# Patient Record
Sex: FEMALE | Race: WHITE | Employment: OTHER | URBAN - METROPOLITAN AREA
[De-identification: names, ages, dates, MRNs, and addresses within clinical notes are randomized per-mention and may not be internally consistent; named-entity substitution may affect disease eponyms.]

---

## 2017-03-03 ENCOUNTER — HOSPITAL ENCOUNTER (OUTPATIENT)
Dept: MAMMOGRAPHY | Age: 77
Discharge: HOME | End: 2017-03-03
Attending: FAMILY MEDICINE
Payer: MEDICARE

## 2017-03-03 DIAGNOSIS — Z12.31 VISIT FOR SCREENING MAMMOGRAM: ICD-10-CM

## 2017-03-03 PROCEDURE — 77067 SCR MAMMO BI INCL CAD: CPT

## 2017-04-19 ENCOUNTER — HOSPITAL ENCOUNTER (OUTPATIENT)
Dept: CT IMAGING | Age: 77
Discharge: HOME | End: 2017-04-19
Attending: FAMILY MEDICINE

## 2017-04-19 VITALS — DIASTOLIC BLOOD PRESSURE: 80 MMHG | SYSTOLIC BLOOD PRESSURE: 133 MMHG | HEART RATE: 76 BPM

## 2017-04-19 DIAGNOSIS — Z13.220 SCREENING CHOLESTEROL LEVEL: ICD-10-CM

## 2017-04-19 NOTE — IMAGING NOTE
Pt here labs drawn total rsvybo=000 hdl=67  Dws=220 muc=805 glucose =81  Calcium score =18.97 . Pt instructed to monitor bp and follow up with md if bp remains up.   Pr verbalizes understanding and agreement also pt instructed to follow up with dr Ybarra [facility]

## 2018-03-29 PROBLEM — H16.223 KERATITIS SICCA, BOTH EYES: Status: ACTIVE | Noted: 2018-03-29

## 2018-03-29 PROBLEM — Z96.1 BILATERAL PSEUDOPHAKIA: Status: ACTIVE | Noted: 2018-03-29

## 2018-03-29 PROBLEM — M35.01 KERATITIS SICCA, BOTH EYES (HCC): Status: ACTIVE | Noted: 2018-03-29

## 2018-03-29 PROBLEM — H01.00B BLEPHARITIS OF UPPER AND LOWER EYELIDS OF BOTH EYES, UNSPECIFIED TYPE: Status: ACTIVE | Noted: 2018-03-29

## 2018-03-29 PROBLEM — H01.00A BLEPHARITIS OF UPPER AND LOWER EYELIDS OF BOTH EYES, UNSPECIFIED TYPE: Status: ACTIVE | Noted: 2018-03-29

## 2018-03-30 ENCOUNTER — HOSPITAL ENCOUNTER (OUTPATIENT)
Dept: MAMMOGRAPHY | Facility: HOSPITAL | Age: 78
Discharge: HOME | End: 2018-03-30
Attending: FAMILY MEDICINE
Payer: MEDICARE

## 2018-03-30 DIAGNOSIS — Z12.31 ENCOUNTER FOR SCREENING MAMMOGRAM FOR MALIGNANT NEOPLASM OF BREAST: ICD-10-CM

## 2018-03-30 PROCEDURE — 77067 SCR MAMMO BI INCL CAD: CPT | Performed by: FAMILY MEDICINE

## 2018-10-11 ENCOUNTER — HOSPITAL ENCOUNTER (OUTPATIENT)
Dept: BONE DENSITY | Age: 78
Discharge: HOME | End: 2018-10-11
Attending: INTERNAL MEDICINE
Payer: MEDICARE

## 2018-10-11 DIAGNOSIS — M81.0 OSTEOPOROSIS: ICD-10-CM

## 2018-10-11 PROCEDURE — 77080 DXA BONE DENSITY AXIAL: CPT | Performed by: INTERNAL MEDICINE

## 2019-01-18 ENCOUNTER — HOSPITAL ENCOUNTER (OUTPATIENT)
Dept: ULTRASOUND IMAGING | Age: 79
Discharge: HOME | End: 2019-01-18
Attending: FAMILY MEDICINE

## 2019-01-18 DIAGNOSIS — Z13.9 VISIT FOR SCREENING: ICD-10-CM

## 2019-01-18 NOTE — PROGRESS NOTES
Pt seen at [facility], [place] for free PV screening  PRELIMINARY SCORE= bilateral carotids=1 no narrowing, no AAA noted  MF=658/74  Cholestec labs as follows: pt declined  TC=  HDL=  LDL=  TG=  GLUCOSE=  All results and risk factors discussed with patient; all

## 2019-04-03 ENCOUNTER — HOSPITAL ENCOUNTER (OUTPATIENT)
Dept: MAMMOGRAPHY | Facility: HOSPITAL | Age: 79
Discharge: HOME | End: 2019-04-03
Attending: FAMILY MEDICINE
Payer: MEDICARE

## 2019-04-03 DIAGNOSIS — Z12.31 ENCOUNTER FOR SCREENING MAMMOGRAM FOR MALIGNANT NEOPLASM OF BREAST: ICD-10-CM

## 2019-04-03 PROCEDURE — 77067 SCR MAMMO BI INCL CAD: CPT | Performed by: FAMILY MEDICINE

## 2019-04-03 PROCEDURE — 77063 BREAST TOMOSYNTHESIS BI: CPT | Performed by: FAMILY MEDICINE

## 2019-10-14 NOTE — H&P
[address] [facility] [address] Gastroenterology                                                                                                  Clinic History and Physical     Pa diverticula, no recurrent tumor in the rectum, moderate-sized internal hemorrhoids, rectal polyp removed?   (Hyperplastic polyp noted in pathology), 3 yr recall     Social Hx:  + Former smoker  + social EtOH  - Denies illicit drug use   - LMP: Postmenopausa negative for diplopia   RESPIRATORY:  negative for severe shortness of breath  CARDIOVASCULAR:  negative for crushing sub-sternal chest pain  GASTROINTESTINAL:  see HPI  GENITOURINARY:  negative for dysuria or gross hematuria  INTEGUMENT/BREAST:  SKIN:  ne asymptomatic from an upper and lower GI perspective with the exception of rare rectal bleeding related to hemorrhoids. No red flag/alarm symptoms. She would like to proceed with a colonoscopy recall.   Procedure and all questions/concerns regarding this w PREP KIT) 17.5-3.13-1.6 GM/177ML Oral Solution 1 Bottle 0     Sig: Take as directed per GI consult notes       Imaging & Referrals:  None       Krista G. Searcy Mcardle, APN  10/21/2019

## 2019-10-21 ENCOUNTER — TELEPHONE (OUTPATIENT)
Dept: GASTROENTEROLOGY | Facility: CLINIC | Age: 79
End: 2019-10-21

## 2019-10-21 ENCOUNTER — OFFICE VISIT (OUTPATIENT)
Dept: GASTROENTEROLOGY | Facility: CLINIC | Age: 79
End: 2019-10-21
Payer: MEDICARE

## 2019-10-21 VITALS
RESPIRATION RATE: 20 BRPM | HEART RATE: 76 BPM | HEIGHT: 64 IN | DIASTOLIC BLOOD PRESSURE: 89 MMHG | BODY MASS INDEX: 28.68 KG/M2 | WEIGHT: 168 LBS | SYSTOLIC BLOOD PRESSURE: 138 MMHG

## 2019-10-21 DIAGNOSIS — Z86.010 HISTORY OF COLON POLYPS: ICD-10-CM

## 2019-10-21 DIAGNOSIS — Z80.0 FAMILY HISTORY OF COLON CANCER: ICD-10-CM

## 2019-10-21 DIAGNOSIS — Z12.11 SCREEN FOR COLON CANCER: Primary | ICD-10-CM

## 2019-10-21 DIAGNOSIS — Z12.11 SCREENING FOR COLON CANCER: Primary | ICD-10-CM

## 2019-10-21 PROCEDURE — G0463 HOSPITAL OUTPT CLINIC VISIT: HCPCS | Performed by: NURSE PRACTITIONER

## 2019-10-21 PROCEDURE — 99203 OFFICE O/P NEW LOW 30 MIN: CPT | Performed by: NURSE PRACTITIONER

## 2019-10-21 NOTE — PATIENT INSTRUCTIONS
Recommend:  -Schedule colonoscopy w/Dr. Angel Tabares, , or Dr. Teri Colon w/ IV Twilight or MAC  Dx: screening, hx colon polyps, FHCC  -Eligible for NE: Yes  -Prep: Split dose Suprep or Colyte or equivalent  -Anti-platelets and anti-coagulants: None

## 2019-10-21 NOTE — TELEPHONE ENCOUNTER
Scheduled for:  Colonoscopy 12828  Provider Name: Dr. Leandro Chow  Date:  1/6/20  Location:  [facility]  Sedation:  IV  Time:  10:45 am, arrival 9:45 am  Prep: Suprep  Meds/Allergies Reconciled?:  Nery/APN reviewed. Diagnosis with codes:  Screening Z12.11;  Hx of polyps

## 2020-01-06 ENCOUNTER — HOSPITAL ENCOUNTER (OUTPATIENT)
Facility: HOSPITAL | Age: 80
Setting detail: HOSPITAL OUTPATIENT SURGERY
Discharge: HOME | End: 2020-01-06
Attending: INTERNAL MEDICINE | Admitting: INTERNAL MEDICINE
Payer: MEDICARE

## 2020-01-06 DIAGNOSIS — Z12.11 SCREEN FOR COLON CANCER: ICD-10-CM

## 2020-01-06 DIAGNOSIS — Z80.0 FAMILY HISTORY OF COLON CANCER: ICD-10-CM

## 2020-01-06 DIAGNOSIS — Z86.010 HISTORY OF COLON POLYPS: ICD-10-CM

## 2020-01-06 PROCEDURE — 45385 COLONOSCOPY W/LESION REMOVAL: CPT | Performed by: INTERNAL MEDICINE

## 2020-01-06 PROCEDURE — 0DBN8ZX EXCISION OF SIGMOID COLON, VIA NATURAL OR ARTIFICIAL OPENING ENDOSCOPIC, DIAGNOSTIC: ICD-10-PCS | Performed by: INTERNAL MEDICINE

## 2020-01-06 PROCEDURE — G0500 MOD SEDAT ENDO SERVICE >5YRS: HCPCS | Performed by: INTERNAL MEDICINE

## 2020-01-06 RX ORDER — SODIUM CHLORIDE, SODIUM LACTATE, POTASSIUM CHLORIDE, CALCIUM CHLORIDE 600; 310; 30; 20 MG/100ML; MG/100ML; MG/100ML; MG/100ML
INJECTION, SOLUTION INTRAVENOUS CONTINUOUS
Status: DISCONTINUED | OUTPATIENT
Start: 2020-01-06 | End: 2020-01-06

## 2020-01-06 RX ORDER — MIDAZOLAM HYDROCHLORIDE 1 MG/ML
1 INJECTION INTRAMUSCULAR; INTRAVENOUS EVERY 5 MIN PRN
Status: DISCONTINUED | OUTPATIENT
Start: 2020-01-06 | End: 2020-01-06

## 2020-01-06 RX ORDER — SODIUM CHLORIDE 0.9 % (FLUSH) 0.9 %
10 SYRINGE (ML) INJECTION AS NEEDED
Status: DISCONTINUED | OUTPATIENT
Start: 2020-01-06 | End: 2020-01-06

## 2020-01-06 RX ORDER — MIDAZOLAM HYDROCHLORIDE 1 MG/ML
INJECTION INTRAMUSCULAR; INTRAVENOUS
Status: DISCONTINUED | OUTPATIENT
Start: 2020-01-06 | End: 2020-01-06

## 2020-01-06 NOTE — OPERATIVE REPORT
[facility] Endoscopy Report      Date of Procedure:  01/06/20      Preoperative Diagnosis:  1. Personal history of adenomatous colon polyps  2. Colorectal cancer screening      Postoperative Diagnosis:  1. Sigmoid colon polyp  2.   Pancol vascular pattern. There were scattered diverticula seen throughout the colon most numerous in the sigmoid without current signs of complication.   In the distal sigmoid/proximal rectum there was a 5-6 mm hyperplastic appearing polyp which was cold snare ex

## 2020-01-06 NOTE — H&P
History & Physical Examination    Patient Name: Agnieszka Gaspar  MRN: [id number]  [facility]: [id number]  YOB: 1940    Diagnosis: Personal history of adenomatous colon polyps, colorectal cancer screening      Omega-3 Fatty Acids (FISH OIL OR), Ta History   Problem Relation Age of Onset   • Cataracts Father    • Macular degeneration Father    • Cataracts Mother    • Macular degeneration Mother    • Heart Disorder Mother      Social History    Tobacco Use      Smoking status: Former Smoker        Pac

## 2020-01-07 ENCOUNTER — TELEPHONE (OUTPATIENT)
Dept: GASTROENTEROLOGY | Facility: CLINIC | Age: 80
End: 2020-01-07

## 2020-01-07 VITALS
SYSTOLIC BLOOD PRESSURE: 107 MMHG | BODY MASS INDEX: 29.22 KG/M2 | OXYGEN SATURATION: 94 % | HEART RATE: 73 BPM | RESPIRATION RATE: 16 BRPM | DIASTOLIC BLOOD PRESSURE: 64 MMHG | WEIGHT: 167 LBS | HEIGHT: 63.5 IN

## 2020-01-08 NOTE — TELEPHONE ENCOUNTER
Operative and surgical path report faxed to PCP at [phone]. Confirmation sheet received. No further action required at this time.

## 2020-01-08 NOTE — TELEPHONE ENCOUNTER
GI RN's-    Dr. Beatriz Koch attempted to electronically fax operative report below to PCP and it failed. Please ensure to fax operative and surgical path report to PCP, thank you.

## 2020-01-08 NOTE — TELEPHONE ENCOUNTER
An attempted fax to the following recipients regarding a patient failed. Use the toolbar buttons to return to the encounter and retry the fax.    Sender     User: Mukesh Martínez MD Department: [facility] Endoscopy Lab      Intended Recipient     Name: Durand Sandifer, perspective.        Technique:  After informed consent, the patient was placed in the left lateral recumbent position. Digital rectal examination revealed no palpable intraluminal abnormalities.   An Olympus variable stiffness 190 series HD colonoscope was

## 2020-05-13 ENCOUNTER — HOSPITAL ENCOUNTER (OUTPATIENT)
Dept: MAMMOGRAPHY | Facility: HOSPITAL | Age: 80
Discharge: HOME | End: 2020-05-13
Attending: FAMILY MEDICINE
Payer: MEDICARE

## 2020-05-13 DIAGNOSIS — Z12.31 ENCOUNTER FOR SCREENING MAMMOGRAM FOR MALIGNANT NEOPLASM OF BREAST: ICD-10-CM

## 2020-05-13 PROCEDURE — 77067 SCR MAMMO BI INCL CAD: CPT | Performed by: FAMILY MEDICINE

## 2020-05-13 PROCEDURE — 77063 BREAST TOMOSYNTHESIS BI: CPT | Performed by: FAMILY MEDICINE

## 2020-07-19 ENCOUNTER — HOSPITAL ENCOUNTER (INPATIENT)
Facility: HOSPITAL | Age: 80
LOS: 8 days | Discharge: HOME | DRG: 381 | End: 2020-07-27
Attending: EMERGENCY MEDICINE | Admitting: HOSPITALIST
Payer: MEDICARE

## 2020-07-19 ENCOUNTER — APPOINTMENT (OUTPATIENT)
Dept: GENERAL RADIOLOGY | Facility: HOSPITAL | Age: 80
DRG: 381 | End: 2020-07-19
Attending: EMERGENCY MEDICINE
Payer: MEDICARE

## 2020-07-19 ENCOUNTER — APPOINTMENT (OUTPATIENT)
Dept: CT IMAGING | Facility: HOSPITAL | Age: 80
DRG: 381 | End: 2020-07-19
Attending: EMERGENCY MEDICINE
Payer: MEDICARE

## 2020-07-19 DIAGNOSIS — K25.5: ICD-10-CM

## 2020-07-19 DIAGNOSIS — K25.1 ACUTE GASTRIC ULCER WITH PERFORATION (HCC): Primary | ICD-10-CM

## 2020-07-19 LAB
ALBUMIN SERPL-MCNC: 3.6 G/DL (ref 3.4–5)
ALP LIVER SERPL-CCNC: 58 U/L (ref 55–142)
ALT SERPL-CCNC: 17 U/L (ref 13–56)
ANION GAP SERPL CALC-SCNC: 5 MMOL/L (ref 0–18)
AST SERPL-CCNC: 19 U/L (ref 15–37)
BASOPHILS # BLD AUTO: 0.03 X10(3) UL (ref 0–0.2)
BASOPHILS NFR BLD AUTO: 0.2 %
BILIRUB DIRECT SERPL-MCNC: 0.4 MG/DL (ref 0–0.2)
BILIRUB SERPL-MCNC: 1.8 MG/DL (ref 0.1–2)
BILIRUB UR QL: NEGATIVE
BUN BLD-MCNC: 12 MG/DL (ref 7–18)
BUN/CREAT SERPL: 12.9 (ref 10–20)
CALCIUM BLD-MCNC: 8.4 MG/DL (ref 8.5–10.1)
CHLORIDE SERPL-SCNC: 103 MMOL/L (ref 98–112)
CO2 SERPL-SCNC: 28 MMOL/L (ref 21–32)
COLOR UR: YELLOW
CREAT BLD-MCNC: 0.93 MG/DL (ref 0.55–1.02)
D DIMER PPP FEU-MCNC: 0.71 UG/ML FEU (ref ?–0.79)
DEPRECATED RDW RBC AUTO: 43.8 FL (ref 35.1–46.3)
EOSINOPHIL # BLD AUTO: 0.01 X10(3) UL (ref 0–0.7)
EOSINOPHIL NFR BLD AUTO: 0.1 %
ERYTHROCYTE [DISTWIDTH] IN BLOOD BY AUTOMATED COUNT: 13 % (ref 11–15)
GLUCOSE BLD-MCNC: 115 MG/DL (ref 70–99)
GLUCOSE UR-MCNC: NEGATIVE MG/DL
HCT VFR BLD AUTO: 46.4 % (ref 35–48)
HGB BLD-MCNC: 15.6 G/DL (ref 12–16)
IMM GRANULOCYTES # BLD AUTO: 0.04 X10(3) UL (ref 0–1)
IMM GRANULOCYTES NFR BLD: 0.3 %
KETONES UR-MCNC: 20 MG/DL
LACTATE SERPL-SCNC: 1.6 MMOL/L (ref 0.4–2)
LIPASE SERPL-CCNC: 103 U/L (ref 73–393)
LYMPHOCYTES # BLD AUTO: 2.42 X10(3) UL (ref 1–4)
LYMPHOCYTES NFR BLD AUTO: 18.4 %
M PROTEIN MFR SERPL ELPH: 8 G/DL (ref 6.4–8.2)
MCH RBC QN AUTO: 31 PG (ref 26–34)
MCHC RBC AUTO-ENTMCNC: 33.6 G/DL (ref 31–37)
MCV RBC AUTO: 92.2 FL (ref 80–100)
MONOCYTES # BLD AUTO: 0.84 X10(3) UL (ref 0.1–1)
MONOCYTES NFR BLD AUTO: 6.4 %
NEUTROPHILS # BLD AUTO: 9.79 X10 (3) UL (ref 1.5–7.7)
NEUTROPHILS # BLD AUTO: 9.79 X10(3) UL (ref 1.5–7.7)
NEUTROPHILS NFR BLD AUTO: 74.6 %
NITRITE UR QL STRIP.AUTO: NEGATIVE
OSMOLALITY SERPL CALC.SUM OF ELEC: 283 MOSM/KG (ref 275–295)
PH UR: 7 [PH] (ref 5–8)
PLATELET # BLD AUTO: 246 10(3)UL (ref 150–450)
POTASSIUM SERPL-SCNC: 3.3 MMOL/L (ref 3.5–5.1)
PROT UR-MCNC: 30 MG/DL
RBC # BLD AUTO: 5.03 X10(6)UL (ref 3.8–5.3)
RBC #/AREA URNS AUTO: 5 /HPF
SARS-COV-2 RNA RESP QL NAA+PROBE: NOT DETECTED
SODIUM SERPL-SCNC: 136 MMOL/L (ref 136–145)
SP GR UR STRIP: 1.02 (ref 1–1.03)
TROPONIN I SERPL-MCNC: <0.045 NG/ML (ref ?–0.04)
UROBILINOGEN UR STRIP-ACNC: <2
WBC # BLD AUTO: 13.1 X10(3) UL (ref 4–11)
WBC #/AREA URNS AUTO: 4 /HPF

## 2020-07-19 PROCEDURE — 71045 X-RAY EXAM CHEST 1 VIEW: CPT | Performed by: EMERGENCY MEDICINE

## 2020-07-19 PROCEDURE — 99223 1ST HOSP IP/OBS HIGH 75: CPT | Performed by: HOSPITALIST

## 2020-07-19 PROCEDURE — 74177 CT ABD & PELVIS W/CONTRAST: CPT | Performed by: EMERGENCY MEDICINE

## 2020-07-19 RX ORDER — DEXTROSE, SODIUM CHLORIDE, AND POTASSIUM CHLORIDE 5; .9; .15 G/100ML; G/100ML; G/100ML
INJECTION INTRAVENOUS CONTINUOUS
Status: DISCONTINUED | OUTPATIENT
Start: 2020-07-19 | End: 2020-07-27

## 2020-07-19 RX ORDER — METOCLOPRAMIDE HYDROCHLORIDE 5 MG/ML
10 INJECTION INTRAMUSCULAR; INTRAVENOUS EVERY 6 HOURS PRN
Status: DISCONTINUED | OUTPATIENT
Start: 2020-07-19 | End: 2020-07-27

## 2020-07-19 RX ORDER — MORPHINE SULFATE 4 MG/ML
2 INJECTION, SOLUTION INTRAMUSCULAR; INTRAVENOUS ONCE
Status: COMPLETED | OUTPATIENT
Start: 2020-07-19 | End: 2020-07-19

## 2020-07-19 RX ORDER — MORPHINE SULFATE 4 MG/ML
4 INJECTION, SOLUTION INTRAMUSCULAR; INTRAVENOUS EVERY 2 HOUR PRN
Status: DISCONTINUED | OUTPATIENT
Start: 2020-07-19 | End: 2020-07-27

## 2020-07-19 RX ORDER — FAMOTIDINE 10 MG/ML
20 INJECTION, SOLUTION INTRAVENOUS ONCE
Status: COMPLETED | OUTPATIENT
Start: 2020-07-19 | End: 2020-07-19

## 2020-07-19 RX ORDER — HYDRALAZINE HYDROCHLORIDE 20 MG/ML
10 INJECTION INTRAMUSCULAR; INTRAVENOUS EVERY 4 HOURS PRN
Status: DISCONTINUED | OUTPATIENT
Start: 2020-07-19 | End: 2020-07-27

## 2020-07-19 RX ORDER — ONDANSETRON 2 MG/ML
4 INJECTION INTRAMUSCULAR; INTRAVENOUS EVERY 6 HOURS PRN
Status: DISCONTINUED | OUTPATIENT
Start: 2020-07-19 | End: 2020-07-27

## 2020-07-19 RX ORDER — MORPHINE SULFATE 2 MG/ML
2 INJECTION, SOLUTION INTRAMUSCULAR; INTRAVENOUS EVERY 2 HOUR PRN
Status: DISCONTINUED | OUTPATIENT
Start: 2020-07-19 | End: 2020-07-27

## 2020-07-19 RX ORDER — SODIUM CHLORIDE 9 MG/ML
125 INJECTION, SOLUTION INTRAVENOUS CONTINUOUS
Status: DISCONTINUED | OUTPATIENT
Start: 2020-07-19 | End: 2020-07-20

## 2020-07-19 NOTE — ED INITIAL ASSESSMENT (HPI)
Pt c/o left and right upper abdominal pain and epigastric pain, pt c/o of left shoulder pain, all starting last night waking up the patient, denies diarrhea,nausea, and vomiting, pt states when she takes a deep breath the pain in her abdomen is worse.

## 2020-07-19 NOTE — ED PROVIDER NOTES
Patient Seen in: [facility] [place] AND [facility] Emergency Department      History   Patient presents with:  Abdomen/Flank Pain    Stated Complaint: Abdominal Pain    HPI  71-year-old female with hypothyroidism on Synthroid presenting for evaluation of epigastric pain Pulse 92   Temp 98.3 °F (36.8 °C) (Oral)   Resp 24   Ht 160 cm (5' 3\")   Wt 72.6 kg   SpO2 94%   BMI 28.34 kg/m²         Physical Exam  Vitals signs and nursing note reviewed. Constitutional:       Appearance: She is well-developed.    HENT:      Head: LACTIC ACID, PLASMA - Normal   RAPID SARS-COV-2 BY PCR - Normal   CBC WITH DIFFERENTIAL WITH PLATELET    Narrative: The following orders were created for panel order CBC WITH DIFFERENTIAL WITH PLATELET.   Procedure                               Abnorm List                       Present on Admission  Date Reviewed: 10/21/2019          ICD-10-CM Noted POA    Acute gastric ulcer with perforation ([facility] K25.1 7/19/2020 Unknown

## 2020-07-20 ENCOUNTER — APPOINTMENT (OUTPATIENT)
Dept: GENERAL RADIOLOGY | Facility: HOSPITAL | Age: 80
DRG: 381 | End: 2020-07-20
Attending: COLON & RECTAL SURGERY
Payer: MEDICARE

## 2020-07-20 LAB
ANION GAP SERPL CALC-SCNC: 6 MMOL/L (ref 0–18)
BASOPHILS # BLD AUTO: 0.03 X10(3) UL (ref 0–0.2)
BASOPHILS NFR BLD AUTO: 0.2 %
BUN BLD-MCNC: 10 MG/DL (ref 7–18)
BUN/CREAT SERPL: 11 (ref 10–20)
CALCIUM BLD-MCNC: 7.8 MG/DL (ref 8.5–10.1)
CHLORIDE SERPL-SCNC: 108 MMOL/L (ref 98–112)
CO2 SERPL-SCNC: 25 MMOL/L (ref 21–32)
CREAT BLD-MCNC: 0.91 MG/DL (ref 0.55–1.02)
DEPRECATED RDW RBC AUTO: 44.4 FL (ref 35.1–46.3)
EOSINOPHIL # BLD AUTO: 0 X10(3) UL (ref 0–0.7)
EOSINOPHIL NFR BLD AUTO: 0 %
ERYTHROCYTE [DISTWIDTH] IN BLOOD BY AUTOMATED COUNT: 13.2 % (ref 11–15)
GLUCOSE BLD-MCNC: 127 MG/DL (ref 70–99)
HCT VFR BLD AUTO: 41.2 % (ref 35–48)
HGB BLD-MCNC: 13.9 G/DL (ref 12–16)
IMM GRANULOCYTES # BLD AUTO: 0.12 X10(3) UL (ref 0–1)
IMM GRANULOCYTES NFR BLD: 0.7 %
LYMPHOCYTES # BLD AUTO: 1.55 X10(3) UL (ref 1–4)
LYMPHOCYTES NFR BLD AUTO: 8.9 %
MCH RBC QN AUTO: 30.8 PG (ref 26–34)
MCHC RBC AUTO-ENTMCNC: 33.7 G/DL (ref 31–37)
MCV RBC AUTO: 91.4 FL (ref 80–100)
MONOCYTES # BLD AUTO: 1.13 X10(3) UL (ref 0.1–1)
MONOCYTES NFR BLD AUTO: 6.5 %
NEUTROPHILS # BLD AUTO: 14.5 X10 (3) UL (ref 1.5–7.7)
NEUTROPHILS # BLD AUTO: 14.5 X10(3) UL (ref 1.5–7.7)
NEUTROPHILS NFR BLD AUTO: 83.7 %
OSMOLALITY SERPL CALC.SUM OF ELEC: 289 MOSM/KG (ref 275–295)
PLATELET # BLD AUTO: 212 10(3)UL (ref 150–450)
POTASSIUM SERPL-SCNC: 3.6 MMOL/L (ref 3.5–5.1)
RBC # BLD AUTO: 4.51 X10(6)UL (ref 3.8–5.3)
SODIUM SERPL-SCNC: 139 MMOL/L (ref 136–145)
WBC # BLD AUTO: 17.3 X10(3) UL (ref 4–11)

## 2020-07-20 PROCEDURE — 71045 X-RAY EXAM CHEST 1 VIEW: CPT | Performed by: COLON & RECTAL SURGERY

## 2020-07-20 PROCEDURE — 99233 SBSQ HOSP IP/OBS HIGH 50: CPT | Performed by: HOSPITALIST

## 2020-07-20 NOTE — PROGRESS NOTES
[facility]    Progress Note    Jennifer Hewitt Patient Status:  Inpatient    3/23/1940 MRN [id number]   Location [facility] 4W/SW/SE Attending Mark Diaz MD   Hosp Day # 1 PCP Hunter Romero MD, Sandra Yoon MD       Subjective:   Tadeo Boone Abdomen+pelvis(contrast Only)(cpt=74177)    Result Date: 7/19/2020  CONCLUSION:  1. NONSPECIFIC GASTRITIS DISTAL GASTRIC ANTRUM WITH SUPERIMPOSED PERFORATED GASTRIC ULCER .   Mild pneumoperitoneum in the upper abdomen with small amount of extraluminal air a intermittent suction to decompress stomach.    -cont Protonix 40 mg IV twice daily  - IV fluids   - ivmorphine as needed for pain.   - iv Zosyn 3.375 g IV piggyback every 8 hours to be continued.     Hypothyroidism  -Hold oral meds for now     Hypokalemia

## 2020-07-20 NOTE — CONSULTS
[facility]    Report of Consultation    Mitzi Mcclelland Patient Status:  Inpatient    3/23/1940 MRN [id number]   Location [facility] 4W/SW/SE Attending Sarahi Juárez MD   Hosp Day # 0 PCP Torito Dooley MD, Lucita Anders MD     Date of Admi COLONOSCOPY N/A 10/29/2016    Performed by Oscar Cadena MD at [address] [place] [address] ENDOSCOPY       Family History  Family History   Problem Relation Age of Onset   • Cataracts Father    • Macular degeneration Father    • Cataracts Mother    • Macular degeneration M SWELLING    Review of Systems:   Pertinent items are noted in HPI.   A comprehensive review of systems was negative except for: see HPi    Physical Exam:      /63   Pulse 94   Temp 98.3 °F (36.8 °C) (Oral)   Resp 25   Ht 5' 3\" (1.6 m)   Wt 160 lb (72 07/19/2020    AST 19 07/19/2020    ALT 17 07/19/2020     07/19/2020    DDIMER 0.71 07/19/2020    TROP <0.045 07/19/2020         Imaging:  Ct Abdomen+pelvis(contrast Only)(cpt=74177)    Result Date: 7/19/2020  CONCLUSION:  1.  NONSPECIFIC GASTRITIS DI patient.     Jg Ham MD    7/19/2020

## 2020-07-20 NOTE — H&P
[facility]    History & Physical    Vipin Crespo Patient Status:  Emergency    3/23/1940 MRN [id number]   Location [address] Attending Brisa Snyder MD   Hosp Day # 0 PCP Torres Nieto MD, Giuseppe Brown MD     Date: smoked 0.00 packs per day for 9.00 years. She has never used smokeless tobacco. She reports current alcohol use. She reports that she does not use drugs.     Allergies:    Sulfa Antibiotics       SWELLING    Home Medications:  Prior to Admission Medications expansion. Cardiovascular:  Normal rate, regular rhythm, no murmur, no edema. Gastrointestinal:  Soft, epigastric tenderness, non-distended, normal bowel sounds, no organomegaly. Lymphatics:  No lymphadenopathy neck, axilla, groin.   Musculoskeletal: Nor surgery    CODE STATUS  Full    Primary care physician  Deng Nathan MD, Liliana Gary MD    Disposition  Clinical course will dictate outcome      Jennifer Mancia MD  7/19/2020  9:43 PM

## 2020-07-20 NOTE — ED NOTES
Orders for admission, patient is aware of plan and ready to go upstairs. Any questions, please call ED EMERY Blanca  at extension [phone].

## 2020-07-20 NOTE — PROGRESS NOTES
Formulary Substitution  From:  cycloSPORINE (RESTASIS) 0.05 % ophthalmic emulsion 1 drop both eyes bid    To:  glycerin-Hypromellose- (ARTIFICAL TEARS) 0.2-0.2-1 % ophthalmic solution 1 drop 4 times daily PRN for dry eyes      Gagan Aguayo, [facility]  X

## 2020-07-20 NOTE — PLAN OF CARE
Patient is alert and oriented times 4, room air, up with a walker and standby assist. On remote tele with no calls overnight. No c/o nausea or uncontrolled pain. NPO.  NG to low intermittent suction- Placement verification portable chest xray and auscultati

## 2020-07-20 NOTE — PROGRESS NOTES
[facility]    Progress Note    Chance Batista Patient Status:  Inpatient    3/23/1940 MRN [id number]   Location [facility] 4W/SW/SE Attending Ava Brown MD   Hosp Day # 1 PCP Maricruz Dominguez MD, Anupama Felix MD        Subjective:   Michael Ryan Continuous  Metoclopramide HCl (REGLAN) injection 10 mg, 10 mg, Intravenous, Q6H PRN  Piperacillin Sod-Tazobactam So (ZOSYN) 3.375 g in dextrose 5 % 100 mL ADD-vantage, 3.375 g, Intravenous, Q8H            Objective:   Vital Signs:  Blood pressure 135/72, Date: 7/19/2020  CONCLUSION:  1. NONSPECIFIC GASTRITIS DISTAL GASTRIC ANTRUM WITH SUPERIMPOSED PERFORATED GASTRIC ULCER . Mild pneumoperitoneum in the upper abdomen with small amount of extraluminal air adjacent to the gastric ulceration.   Recommend surgi

## 2020-07-21 LAB
ANION GAP SERPL CALC-SCNC: 5 MMOL/L (ref 0–18)
BASOPHILS # BLD AUTO: 0.03 X10(3) UL (ref 0–0.2)
BASOPHILS NFR BLD AUTO: 0.3 %
BUN BLD-MCNC: 6 MG/DL (ref 7–18)
BUN/CREAT SERPL: 7.9 (ref 10–20)
CALCIUM BLD-MCNC: 7.7 MG/DL (ref 8.5–10.1)
CHLORIDE SERPL-SCNC: 110 MMOL/L (ref 98–112)
CO2 SERPL-SCNC: 25 MMOL/L (ref 21–32)
CREAT BLD-MCNC: 0.76 MG/DL (ref 0.55–1.02)
DEPRECATED RDW RBC AUTO: 45 FL (ref 35.1–46.3)
EOSINOPHIL # BLD AUTO: 0.11 X10(3) UL (ref 0–0.7)
EOSINOPHIL NFR BLD AUTO: 1.1 %
ERYTHROCYTE [DISTWIDTH] IN BLOOD BY AUTOMATED COUNT: 13.2 % (ref 11–15)
GLUCOSE BLD-MCNC: 116 MG/DL (ref 70–99)
HAV IGM SER QL: 2.3 MG/DL (ref 1.6–2.6)
HCT VFR BLD AUTO: 42.2 % (ref 35–48)
HGB BLD-MCNC: 14.3 G/DL (ref 12–16)
IMM GRANULOCYTES # BLD AUTO: 0.04 X10(3) UL (ref 0–1)
IMM GRANULOCYTES NFR BLD: 0.4 %
LYMPHOCYTES # BLD AUTO: 1.85 X10(3) UL (ref 1–4)
LYMPHOCYTES NFR BLD AUTO: 18.1 %
MCH RBC QN AUTO: 31.3 PG (ref 26–34)
MCHC RBC AUTO-ENTMCNC: 33.9 G/DL (ref 31–37)
MCV RBC AUTO: 92.3 FL (ref 80–100)
MONOCYTES # BLD AUTO: 0.67 X10(3) UL (ref 0.1–1)
MONOCYTES NFR BLD AUTO: 6.6 %
NEUTROPHILS # BLD AUTO: 7.51 X10 (3) UL (ref 1.5–7.7)
NEUTROPHILS # BLD AUTO: 7.51 X10(3) UL (ref 1.5–7.7)
NEUTROPHILS NFR BLD AUTO: 73.5 %
OSMOLALITY SERPL CALC.SUM OF ELEC: 289 MOSM/KG (ref 275–295)
PLATELET # BLD AUTO: 187 10(3)UL (ref 150–450)
POTASSIUM SERPL-SCNC: 3.5 MMOL/L (ref 3.5–5.1)
RBC # BLD AUTO: 4.57 X10(6)UL (ref 3.8–5.3)
SODIUM SERPL-SCNC: 140 MMOL/L (ref 136–145)
WBC # BLD AUTO: 10.2 X10(3) UL (ref 4–11)

## 2020-07-21 PROCEDURE — 99233 SBSQ HOSP IP/OBS HIGH 50: CPT | Performed by: HOSPITALIST

## 2020-07-21 RX ORDER — HEPARIN SODIUM 5000 [USP'U]/ML
5000 INJECTION, SOLUTION INTRAVENOUS; SUBCUTANEOUS EVERY 12 HOURS SCHEDULED
Status: DISCONTINUED | OUTPATIENT
Start: 2020-07-21 | End: 2020-07-27

## 2020-07-21 NOTE — PROGRESS NOTES
[facility]    Progress Note    [facility] Patient Status:  Inpatient    3/23/1940 MRN [id number]   Location [facility] 4W/SW/SE Attending Corinne Vazquez MD   Hosp Day # 2 PCP German Bradford MD, Dain King MD        Subjective: treatment plan and work up.           Results:     Lab Results   Component Value Date    WBC 10.2 07/21/2020    HGB 14.3 07/21/2020    HCT 42.2 07/21/2020    .0 07/21/2020    CREATSERUM 0.76 07/21/2020    BUN 6 (L) 07/21/2020     07/21/2020 atelectasis/scarring  2.  Tortuous atherosclerotic aorta    Dictated by (CST): Hubert Carson MD on 7/19/2020 at 7:32 PM     Finalized by (CST): Hubert Carson MD on 7/19/2020 at 7:34 PM          Ekg 12-lead    Result Date: 7/19/2020  ECG Report

## 2020-07-21 NOTE — PLAN OF CARE
Problem: Patient/Family Goals  Goal: Patient/Family Long Term Goal  Description  Patient's Long Term Goal: Go home upon discharge   Interventions   -Monitor labs  -Control Pain  -Attentive listening   -Patient centered care   -Safety     Outcome: Mati absence of nausea and vomiting  Description  INTERVENTIONS:  - Maintain adequate hydration with IV or PO as ordered and tolerated  - Nasogastric tube to low intermittent suction as ordered  - Evaluate effectiveness of ordered antiemetic medications  - Prov

## 2020-07-21 NOTE — PLAN OF CARE
Patient alert and oriented. NPO, NG to LIS, output mostly green with mucous shreds. Headache, denied pain meds, hot pack with some relief. Voiding. Denies nausea. Up with SBA. Call light within reach, using appropriately.      Problem: Patient/Family Goals consult as needed  - Evaluate fluid balance  Outcome: Progressing  Goal: Maintains or returns to baseline bowel function  Description  INTERVENTIONS:  - Assess bowel function  - Maintain adequate hydration with IV or PO as ordered and tolerated  - Evaluate

## 2020-07-21 NOTE — PROGRESS NOTES
[place] [facility]    Progress Note    Zoraida Paez Patient Status:  Inpatient    3/23/1940 MRN [id number]   Location [facility] 4W/SW/SE Attending Wendi Malone MD   Hosp Day # 2 PCP Sonu James MD, Liam Meyers MD        Subjective:   Jay Ledesma (APRESOLINE) injection 10 mg, 10 mg, Intravenous, Q4H PRN  dextrose 5 % and 0.9 % NaCl with KCl 20 mEq infusion, , Intravenous, Continuous  Metoclopramide HCl (REGLAN) injection 10 mg, 10 mg, Intravenous, Q6H PRN  Piperacillin Sod-Tazobactam So (ZOSYN) 3.3 07/19/2020    ALKPHO 58 07/19/2020    BILT 1.8 07/19/2020    TP 8.0 07/19/2020    AST 19 07/19/2020    ALT 17 07/19/2020     07/19/2020    DDIMER 0.71 07/19/2020    MG 2.3 07/21/2020    TROP <0.045 07/19/2020       Ct Abdomen+pelvis(contrast Only)(c 12/02/2011 10:59:10 Electronically signed on 07/20/2020 at 07:25 by Janie Quintero MD

## 2020-07-21 NOTE — PLAN OF CARE
IVF infusing. Zosyn as scheduled. NG to LIS. Voiding. Passing flatus, no bowel movement overnight. Protonix as ordered. Up with assist and walker.           Problem: Patient/Family Goals  Goal: Patient/Family Long Term Goal  Description  Patient's Long Term Anticipate increased pain with activity and pre-medicate as appropriate  Outcome: Progressing     Problem: GASTROINTESTINAL - ADULT  Goal: Minimal or absence of nausea and vomiting  Description  INTERVENTIONS:  - Maintain adequate hydration with IV or PO a

## 2020-07-22 LAB
ANION GAP SERPL CALC-SCNC: 8 MMOL/L (ref 0–18)
BASOPHILS # BLD AUTO: 0.02 X10(3) UL (ref 0–0.2)
BASOPHILS NFR BLD AUTO: 0.3 %
BUN BLD-MCNC: 4 MG/DL (ref 7–18)
BUN/CREAT SERPL: 6 (ref 10–20)
CALCIUM BLD-MCNC: 7.9 MG/DL (ref 8.5–10.1)
CHLORIDE SERPL-SCNC: 112 MMOL/L (ref 98–112)
CO2 SERPL-SCNC: 21 MMOL/L (ref 21–32)
CREAT BLD-MCNC: 0.67 MG/DL (ref 0.55–1.02)
DEPRECATED RDW RBC AUTO: 43.8 FL (ref 35.1–46.3)
EOSINOPHIL # BLD AUTO: 0.23 X10(3) UL (ref 0–0.7)
EOSINOPHIL NFR BLD AUTO: 3.2 %
ERYTHROCYTE [DISTWIDTH] IN BLOOD BY AUTOMATED COUNT: 13.1 % (ref 11–15)
GLUCOSE BLD-MCNC: 120 MG/DL (ref 70–99)
HAV IGM SER QL: 2.3 MG/DL (ref 1.6–2.6)
HCT VFR BLD AUTO: 40.9 % (ref 35–48)
HGB BLD-MCNC: 13.8 G/DL (ref 12–16)
IMM GRANULOCYTES # BLD AUTO: 0.03 X10(3) UL (ref 0–1)
IMM GRANULOCYTES NFR BLD: 0.4 %
LYMPHOCYTES # BLD AUTO: 1.96 X10(3) UL (ref 1–4)
LYMPHOCYTES NFR BLD AUTO: 27.1 %
MCH RBC QN AUTO: 30.9 PG (ref 26–34)
MCHC RBC AUTO-ENTMCNC: 33.7 G/DL (ref 31–37)
MCV RBC AUTO: 91.5 FL (ref 80–100)
MONOCYTES # BLD AUTO: 0.58 X10(3) UL (ref 0.1–1)
MONOCYTES NFR BLD AUTO: 8 %
NEUTROPHILS # BLD AUTO: 4.42 X10 (3) UL (ref 1.5–7.7)
NEUTROPHILS # BLD AUTO: 4.42 X10(3) UL (ref 1.5–7.7)
NEUTROPHILS NFR BLD AUTO: 61 %
OSMOLALITY SERPL CALC.SUM OF ELEC: 290 MOSM/KG (ref 275–295)
PHOSPHATE SERPL-MCNC: 1.4 MG/DL (ref 2.5–4.9)
PHOSPHATE SERPL-MCNC: 3.6 MG/DL (ref 2.5–4.9)
PLATELET # BLD AUTO: 204 10(3)UL (ref 150–450)
POTASSIUM SERPL-SCNC: 3.7 MMOL/L (ref 3.5–5.1)
RBC # BLD AUTO: 4.47 X10(6)UL (ref 3.8–5.3)
SODIUM SERPL-SCNC: 141 MMOL/L (ref 136–145)
WBC # BLD AUTO: 7.2 X10(3) UL (ref 4–11)

## 2020-07-22 PROCEDURE — 99233 SBSQ HOSP IP/OBS HIGH 50: CPT | Performed by: HOSPITALIST

## 2020-07-22 RX ORDER — POTASSIUM CHLORIDE 14.9 MG/ML
20 INJECTION INTRAVENOUS ONCE
Status: COMPLETED | OUTPATIENT
Start: 2020-07-22 | End: 2020-07-22

## 2020-07-22 NOTE — PLAN OF CARE
Ng tube maintained to low intermittent suction with clear green drainage. Irritated throat improved with addition of phenol spray. IV antibiotics and fluids continue as ordered. Patient asleep intermittently through night.     Problem: Patient/Family [facility] Notify MD/LIP if interventions unsuccessful or patient reports new pain  - Anticipate increased pain with activity and pre-medicate as appropriate  Outcome: Progressing     Problem: GASTROINTESTINAL - ADULT  Goal: Minimal or absence of nausea and vomiting

## 2020-07-22 NOTE — PROGRESS NOTES
[place] [facility]    Progress Note    True Bruce Patient Status:  Inpatient    3/23/1940 MRN [id number]   Location [facility] 4W/SW/SE Attending Sarah Henry MD   Hosp Day # 3 PCP Arjun Holcobm MD, Sly Sebastian MD       Subjective:   Delfino Morrissey gallbladder. No biliary dilatation. 3. Small benign hepatic cysts versus biliary hamartomas. 4. Colonic diverticulosis without diverticulitis. 5. Normal retrocecal appendix. 6. Bibasilar atelectasis/scarring.  7. Moderate levoscoliosis and advanced multile electrolyte replacement protocol, potassium to be supplemented and IV fluids as well.     Prophylaxis  SCDs, will start heparin subq BID.      CODE STATUS  Full     Primary care physician  Nadia Brady MD, Frankie Calvillo MD     Disposition  Clinical course will dictate o

## 2020-07-22 NOTE — PROGRESS NOTES
[facility]    Progress Note    Chance Batista Patient Status:  Inpatient    3/23/1940 MRN [id number]   Location [facility] 4W/SW/SE Attending Ava Brown MD   Hosp Day # 3 PCP Maricruz Dominguez MD, Anupama Felix MD        Subjective:   Michael Ryan injection 4 mg, 4 mg, Intravenous, Q6H PRN  morphINE sulfate (PF) 2 MG/ML injection 2 mg, 2 mg, Intravenous, Q2H PRN    Or  morphINE sulfate (PF) 4 MG/ML injection 4 mg, 4 mg, Intravenous, Q2H PRN  hydrALAzine HCl (APRESOLINE) injection 10 mg, 10 mg, Intra  07/22/2020    K 3.7 07/22/2020     07/22/2020    CO2 21.0 07/22/2020     (H) 07/22/2020    CA 7.9 (L) 07/22/2020    ALB 3.6 07/19/2020    ALKPHO 58 07/19/2020    BILT 1.8 07/19/2020    TP 8.0 07/19/2020    AST 19 07/19/2020    ALT 17

## 2020-07-23 ENCOUNTER — APPOINTMENT (OUTPATIENT)
Dept: GENERAL RADIOLOGY | Facility: HOSPITAL | Age: 80
DRG: 381 | End: 2020-07-23
Attending: COLON & RECTAL SURGERY
Payer: MEDICARE

## 2020-07-23 LAB
ANION GAP SERPL CALC-SCNC: 5 MMOL/L (ref 0–18)
BASOPHILS # BLD AUTO: 0.04 X10(3) UL (ref 0–0.2)
BASOPHILS NFR BLD AUTO: 0.6 %
BUN BLD-MCNC: 5 MG/DL (ref 7–18)
BUN/CREAT SERPL: 6.4 (ref 10–20)
CALCIUM BLD-MCNC: 7.6 MG/DL (ref 8.5–10.1)
CHLORIDE SERPL-SCNC: 113 MMOL/L (ref 98–112)
CO2 SERPL-SCNC: 24 MMOL/L (ref 21–32)
CREAT BLD-MCNC: 0.78 MG/DL (ref 0.55–1.02)
DEPRECATED RDW RBC AUTO: 44.6 FL (ref 35.1–46.3)
EOSINOPHIL # BLD AUTO: 0.25 X10(3) UL (ref 0–0.7)
EOSINOPHIL NFR BLD AUTO: 3.8 %
ERYTHROCYTE [DISTWIDTH] IN BLOOD BY AUTOMATED COUNT: 13.1 % (ref 11–15)
GLUCOSE BLD-MCNC: 117 MG/DL (ref 70–99)
HCT VFR BLD AUTO: 39.6 % (ref 35–48)
HGB BLD-MCNC: 13.3 G/DL (ref 12–16)
IMM GRANULOCYTES # BLD AUTO: 0.02 X10(3) UL (ref 0–1)
IMM GRANULOCYTES NFR BLD: 0.3 %
LYMPHOCYTES # BLD AUTO: 2.38 X10(3) UL (ref 1–4)
LYMPHOCYTES NFR BLD AUTO: 35.9 %
MCH RBC QN AUTO: 31.3 PG (ref 26–34)
MCHC RBC AUTO-ENTMCNC: 33.6 G/DL (ref 31–37)
MCV RBC AUTO: 93.2 FL (ref 80–100)
MONOCYTES # BLD AUTO: 0.58 X10(3) UL (ref 0.1–1)
MONOCYTES NFR BLD AUTO: 8.7 %
NEUTROPHILS # BLD AUTO: 3.36 X10 (3) UL (ref 1.5–7.7)
NEUTROPHILS # BLD AUTO: 3.36 X10(3) UL (ref 1.5–7.7)
NEUTROPHILS NFR BLD AUTO: 50.7 %
OSMOLALITY SERPL CALC.SUM OF ELEC: 292 MOSM/KG (ref 275–295)
PLATELET # BLD AUTO: 233 10(3)UL (ref 150–450)
POTASSIUM SERPL-SCNC: 3.4 MMOL/L (ref 3.5–5.1)
RBC # BLD AUTO: 4.25 X10(6)UL (ref 3.8–5.3)
SODIUM SERPL-SCNC: 142 MMOL/L (ref 136–145)
WBC # BLD AUTO: 6.6 X10(3) UL (ref 4–11)

## 2020-07-23 PROCEDURE — 99233 SBSQ HOSP IP/OBS HIGH 50: CPT | Performed by: HOSPITALIST

## 2020-07-23 PROCEDURE — 74240 X-RAY XM UPR GI TRC 1CNTRST: CPT | Performed by: COLON & RECTAL SURGERY

## 2020-07-23 NOTE — PROGRESS NOTES
[facility]    Progress Note    Rashida Smith Patient Status:  Inpatient    3/23/1940 MRN [id number]   Location [facility] 4W/SW/SE Attending Jason Palomino MD   Hosp Day # 4 PCP Bernadine Quigley MD, Linda Gaxiola MD       Subjective:   Sarah Diamond Q8H     phenol, glycerin-Hypromellose-, ondansetron HCl, morphINE sulfate **OR** morphINE sulfate, hydrALAzine HCl, Metoclopramide HCl      Assessment and Plan:       Acute gastric ulcer with perforation ([address]  - per upper GI series perforation is r

## 2020-07-23 NOTE — PLAN OF CARE
Pt A&Ox3, VSS, afebrile, NGT to LIS, no c/o pain, IVF's as per MD order.     Problem: Patient/Family Goals  Goal: Patient/Family Long Term Goal  Description  Patient's Long Term Goal: Go home upon discharge   Interventions   -Monitor labs  -Control Pain  -A appropriate  Outcome: Progressing     Problem: GASTROINTESTINAL - ADULT  Goal: Minimal or absence of nausea and vomiting  Description  INTERVENTIONS:  - Maintain adequate hydration with IV or PO as ordered and tolerated  - Nasogastric tube to low intermitt

## 2020-07-23 NOTE — PROGRESS NOTES
[facility]    Progress Note    Mauricio Gross Patient Status:  Inpatient    3/23/1940 MRN [id number]   Location [facility] 4W/SW/SE Attending Stephanie Resendez MD   Hosp Day # 4 PCP Laura Costa MD, Sally Mccormack MD        Subjective:   Mely Perales 1,000 mL, Intravenous, Once  [COMPLETED] morphINE sulfate (PF) 4 MG/ML injection 2 mg, 2 mg, Intravenous, Once  ondansetron HCl (ZOFRAN) injection 4 mg, 4 mg, Intravenous, Q6H PRN  morphINE sulfate (PF) 2 MG/ML injection 2 mg, 2 mg, Intravenous, Q2H PRN MD  07/22/20      Results:     Lab Results   Component Value Date    WBC 6.6 07/23/2020    HGB 13.3 07/23/2020    HCT 39.6 07/23/2020    .0 07/23/2020    CREATSERUM 0.78 07/23/2020    BUN 5 (L) 07/23/2020     07/23/2020    K 3.4 (L) 07/23/2020

## 2020-07-24 LAB
ANION GAP SERPL CALC-SCNC: 4 MMOL/L (ref 0–18)
BASOPHILS # BLD AUTO: 0.04 X10(3) UL (ref 0–0.2)
BASOPHILS NFR BLD AUTO: 0.7 %
BUN BLD-MCNC: 4 MG/DL (ref 7–18)
BUN/CREAT SERPL: 5.3 (ref 10–20)
CALCIUM BLD-MCNC: 8.1 MG/DL (ref 8.5–10.1)
CHLORIDE SERPL-SCNC: 113 MMOL/L (ref 98–112)
CO2 SERPL-SCNC: 26 MMOL/L (ref 21–32)
CREAT BLD-MCNC: 0.75 MG/DL (ref 0.55–1.02)
DEPRECATED RDW RBC AUTO: 44.9 FL (ref 35.1–46.3)
EOSINOPHIL # BLD AUTO: 0.29 X10(3) UL (ref 0–0.7)
EOSINOPHIL NFR BLD AUTO: 4.9 %
ERYTHROCYTE [DISTWIDTH] IN BLOOD BY AUTOMATED COUNT: 13 % (ref 11–15)
GLUCOSE BLD-MCNC: 105 MG/DL (ref 70–99)
HCT VFR BLD AUTO: 40.2 % (ref 35–48)
HGB BLD-MCNC: 13.6 G/DL (ref 12–16)
IMM GRANULOCYTES # BLD AUTO: 0.02 X10(3) UL (ref 0–1)
IMM GRANULOCYTES NFR BLD: 0.3 %
LYMPHOCYTES # BLD AUTO: 2.4 X10(3) UL (ref 1–4)
LYMPHOCYTES NFR BLD AUTO: 40.2 %
MCH RBC QN AUTO: 31.4 PG (ref 26–34)
MCHC RBC AUTO-ENTMCNC: 33.8 G/DL (ref 31–37)
MCV RBC AUTO: 92.8 FL (ref 80–100)
MONOCYTES # BLD AUTO: 0.52 X10(3) UL (ref 0.1–1)
MONOCYTES NFR BLD AUTO: 8.7 %
NEUTROPHILS # BLD AUTO: 2.7 X10 (3) UL (ref 1.5–7.7)
NEUTROPHILS # BLD AUTO: 2.7 X10(3) UL (ref 1.5–7.7)
NEUTROPHILS NFR BLD AUTO: 45.2 %
OSMOLALITY SERPL CALC.SUM OF ELEC: 293 MOSM/KG (ref 275–295)
PLATELET # BLD AUTO: 245 10(3)UL (ref 150–450)
POTASSIUM SERPL-SCNC: 3.8 MMOL/L (ref 3.5–5.1)
RBC # BLD AUTO: 4.33 X10(6)UL (ref 3.8–5.3)
SODIUM SERPL-SCNC: 143 MMOL/L (ref 136–145)
WBC # BLD AUTO: 6 X10(3) UL (ref 4–11)

## 2020-07-24 PROCEDURE — 99233 SBSQ HOSP IP/OBS HIGH 50: CPT | Performed by: HOSPITALIST

## 2020-07-24 NOTE — PLAN OF CARE
Had a BM this morning. Denies pain. Started on clear liquid diet, tolerating well, no N/V or abdominal pain. IVF infusing, zosyn. Walking independently, walked in hallway many times this shift. Tele in place.  Bed in lowest position, call light in reach, fa

## 2020-07-24 NOTE — DIETARY NOTE
ADULT NUTRITION INITIAL ASSESSMENT    Pt is at moderate nutrition risk. Pt does not meet malnutrition criteria. RECOMMENDATIONS TO MD:  progress diet as tolerated to soft/low fiber diet.        NUTRITION DIAGNOSIS/PROBLEM:  Inadequate oral intake re Last 10 Encounters:  07/19/20 : 72.6 kg (160 lb)  12/31/19 : 75.8 kg (167 lb)  10/21/19 : 76.2 kg (168 lb)  10/29/16 : 73.9 kg (163 lb)      GASTROINTESTINAL: NGT removed yesterday. Gastric ulcer with perf improving.   BM this am.      FOOD/NUTRITION RELATE gradual wt loss, improved GI status and diet progression to solids in next 24-48hr. DIETITIAN FOLLOW UP: RD to follow.       [address], [address] [facility]   Clinical Dietitian [id number]

## 2020-07-24 NOTE — PROGRESS NOTES
[place] [facility]    Progress Note    Greg Bocanegra Patient Status:  Inpatient    3/23/1940 MRN [id number]   Location [facility] 4W/SW/SE Attending Naye Navarro MD   Hosp Day # 5 PCP Cesar Meyer MD, Oral MD Mari       Subjective:   Lew Romo phenol, glycerin-Hypromellose-, ondansetron HCl, morphINE sulfate **OR** morphINE sulfate, hydrALAzine HCl, Metoclopramide HCl      Assessment and Plan:       Acute gastric ulcer with perforation ([address]  - per upper GI series perforation is resol

## 2020-07-25 ENCOUNTER — APPOINTMENT (OUTPATIENT)
Dept: CT IMAGING | Facility: HOSPITAL | Age: 80
DRG: 381 | End: 2020-07-25
Attending: COLON & RECTAL SURGERY
Payer: MEDICARE

## 2020-07-25 LAB
ANION GAP SERPL CALC-SCNC: 3 MMOL/L (ref 0–18)
BASOPHILS # BLD AUTO: 0.04 X10(3) UL (ref 0–0.2)
BASOPHILS NFR BLD AUTO: 0.7 %
BUN BLD-MCNC: 2 MG/DL (ref 7–18)
BUN/CREAT SERPL: 2.3 (ref 10–20)
CALCIUM BLD-MCNC: 8.4 MG/DL (ref 8.5–10.1)
CHLORIDE SERPL-SCNC: 113 MMOL/L (ref 98–112)
CO2 SERPL-SCNC: 27 MMOL/L (ref 21–32)
CREAT BLD-MCNC: 0.88 MG/DL (ref 0.55–1.02)
DEPRECATED RDW RBC AUTO: 43.6 FL (ref 35.1–46.3)
EOSINOPHIL # BLD AUTO: 0.2 X10(3) UL (ref 0–0.7)
EOSINOPHIL NFR BLD AUTO: 3.4 %
ERYTHROCYTE [DISTWIDTH] IN BLOOD BY AUTOMATED COUNT: 12.9 % (ref 11–15)
GLUCOSE BLD-MCNC: 112 MG/DL (ref 70–99)
HCT VFR BLD AUTO: 39.9 % (ref 35–48)
HGB BLD-MCNC: 13.5 G/DL (ref 12–16)
IMM GRANULOCYTES # BLD AUTO: 0.03 X10(3) UL (ref 0–1)
IMM GRANULOCYTES NFR BLD: 0.5 %
LYMPHOCYTES # BLD AUTO: 1.92 X10(3) UL (ref 1–4)
LYMPHOCYTES NFR BLD AUTO: 33.1 %
MCH RBC QN AUTO: 31 PG (ref 26–34)
MCHC RBC AUTO-ENTMCNC: 33.8 G/DL (ref 31–37)
MCV RBC AUTO: 91.7 FL (ref 80–100)
MONOCYTES # BLD AUTO: 0.44 X10(3) UL (ref 0.1–1)
MONOCYTES NFR BLD AUTO: 7.6 %
NEUTROPHILS # BLD AUTO: 3.17 X10 (3) UL (ref 1.5–7.7)
NEUTROPHILS # BLD AUTO: 3.17 X10(3) UL (ref 1.5–7.7)
NEUTROPHILS NFR BLD AUTO: 54.7 %
OSMOLALITY SERPL CALC.SUM OF ELEC: 293 MOSM/KG (ref 275–295)
PLATELET # BLD AUTO: 261 10(3)UL (ref 150–450)
POTASSIUM SERPL-SCNC: 4.2 MMOL/L (ref 3.5–5.1)
RBC # BLD AUTO: 4.35 X10(6)UL (ref 3.8–5.3)
SODIUM SERPL-SCNC: 143 MMOL/L (ref 136–145)
WBC # BLD AUTO: 5.8 X10(3) UL (ref 4–11)

## 2020-07-25 PROCEDURE — 74177 CT ABD & PELVIS W/CONTRAST: CPT | Performed by: COLON & RECTAL SURGERY

## 2020-07-25 PROCEDURE — 99233 SBSQ HOSP IP/OBS HIGH 50: CPT | Performed by: HOSPITALIST

## 2020-07-25 NOTE — PROGRESS NOTES
[facility]    Progress Note    Yaniv Pineda Patient Status:  Inpatient    3/23/1940 MRN [id number]   Location [facility] 4W/SW/SE Attending Katherine Cerrato MD   Hosp Day # 5 PCP Gregory Lassiter MD, Radha Hawk MD        Subjective:   Sarbjit Baez ADD-vantage, 3.375 g, Intravenous, Once  [COMPLETED] sodium chloride 0.9% IV bolus 1,000 mL, 1,000 mL, Intravenous, Once  [COMPLETED] morphINE sulfate (PF) 4 MG/ML injection 2 mg, 2 mg, Intravenous, Once  ondansetron HCl (ZOFRAN) injection 4 mg, 4 mg, Intr complaint of mild  LUQ discomfort  Continue n.p.o., IV Zosyn, serial examinations  Continue IV protonix    Check CT to exclude abscess  Advance diet in AM if no setbacks overnight      Narda Plunkett MD  07/24/20      Results:     Lab Results   Component Valu

## 2020-07-25 NOTE — PROGRESS NOTES
[facility]    Progress Note    Etha Sizer Patient Status:  Inpatient    3/23/1940 MRN [id number]   Location [facility] 4W/SW/SE Attending Laura Martinez MD   Hosp Day # 6 PCP Deng Nathan MD, Liliana Gary MD       Subjective:   Yasmin Pavon diverticulosis. 4. Cholelithiasis without CT evidence of acute complication. 5. Hepatic steatosis. 6. Lesser incidental findings as above.     Dictated by (CST): Nallely Streeter MD on 7/25/2020 at 9:51 AM     Finalized by (CST): Nallely Streeter MD on 7/2

## 2020-07-25 NOTE — PROGRESS NOTES
[facility]    Progress Note    Racquel Minler Patient Status:  Inpatient    3/23/1940 MRN [id number]   Location [facility] 4W/SW/SE Attending Shanique Thrasher MD   Hosp Day # 6 PCP Lesia Gaming MD, Yamel Moreno MD        Subjective:   Lianne Aguilera Intravenous, ONCE PRN  [COMPLETED] Piperacillin Sod-Tazobactam So (ZOSYN) 3.375 g in dextrose 5 % 100 mL ADD-vantage, 3.375 g, Intravenous, Once  [COMPLETED] sodium chloride 0.9% IV bolus 1,000 mL, 1,000 mL, Intravenous, Once  [COMPLETED] morphINE sulfate liquids  Vague complaint of mild  LUQ discomfort has resolved  Continue IV protonix    Current CT completed this morning  Very small air/fluid collection between stomach and liver   She does not exhibit and symptoms or findings consistent with this and she

## 2020-07-25 NOTE — PLAN OF CARE
Pt is alert and oriented x4. Denies any pain or nausea overnight. Tolerating clear liquid diet. Tele in place. IVF. Call light within reach. Plan for CT with contrast in the morning.     Problem: Patient/Family Goals  Goal: Patient/Family Long Term Goal  Timbo Mcmahan or patient reports new pain  - Anticipate increased pain with activity and pre-medicate as appropriate  Outcome: Progressing     Problem: GASTROINTESTINAL - ADULT  Goal: Minimal or absence of nausea and vomiting  Description  INTERVENTIONS:  - Maintain sage

## 2020-07-26 LAB
ANION GAP SERPL CALC-SCNC: 6 MMOL/L (ref 0–18)
BASOPHILS # BLD AUTO: 0.05 X10(3) UL (ref 0–0.2)
BASOPHILS NFR BLD AUTO: 0.8 %
BUN BLD-MCNC: 2 MG/DL (ref 7–18)
BUN/CREAT SERPL: 2.2 (ref 10–20)
CALCIUM BLD-MCNC: 8.4 MG/DL (ref 8.5–10.1)
CHLORIDE SERPL-SCNC: 111 MMOL/L (ref 98–112)
CO2 SERPL-SCNC: 26 MMOL/L (ref 21–32)
CREAT BLD-MCNC: 0.9 MG/DL (ref 0.55–1.02)
DEPRECATED RDW RBC AUTO: 44.6 FL (ref 35.1–46.3)
EOSINOPHIL # BLD AUTO: 0.32 X10(3) UL (ref 0–0.7)
EOSINOPHIL NFR BLD AUTO: 4.9 %
ERYTHROCYTE [DISTWIDTH] IN BLOOD BY AUTOMATED COUNT: 13.2 % (ref 11–15)
GLUCOSE BLD-MCNC: 115 MG/DL (ref 70–99)
HCT VFR BLD AUTO: 38.1 % (ref 35–48)
HGB BLD-MCNC: 12.9 G/DL (ref 12–16)
IMM GRANULOCYTES # BLD AUTO: 0.04 X10(3) UL (ref 0–1)
IMM GRANULOCYTES NFR BLD: 0.6 %
LYMPHOCYTES # BLD AUTO: 2.52 X10(3) UL (ref 1–4)
LYMPHOCYTES NFR BLD AUTO: 38.4 %
MCH RBC QN AUTO: 31.3 PG (ref 26–34)
MCHC RBC AUTO-ENTMCNC: 33.9 G/DL (ref 31–37)
MCV RBC AUTO: 92.5 FL (ref 80–100)
MONOCYTES # BLD AUTO: 0.51 X10(3) UL (ref 0.1–1)
MONOCYTES NFR BLD AUTO: 7.8 %
NEUTROPHILS # BLD AUTO: 3.12 X10 (3) UL (ref 1.5–7.7)
NEUTROPHILS # BLD AUTO: 3.12 X10(3) UL (ref 1.5–7.7)
NEUTROPHILS NFR BLD AUTO: 47.5 %
OSMOLALITY SERPL CALC.SUM OF ELEC: 293 MOSM/KG (ref 275–295)
PLATELET # BLD AUTO: 258 10(3)UL (ref 150–450)
POTASSIUM SERPL-SCNC: 3.8 MMOL/L (ref 3.5–5.1)
RBC # BLD AUTO: 4.12 X10(6)UL (ref 3.8–5.3)
SODIUM SERPL-SCNC: 143 MMOL/L (ref 136–145)
WBC # BLD AUTO: 6.6 X10(3) UL (ref 4–11)

## 2020-07-26 PROCEDURE — 99233 SBSQ HOSP IP/OBS HIGH 50: CPT | Performed by: HOSPITALIST

## 2020-07-26 RX ORDER — POTASSIUM CHLORIDE 20 MEQ/1
40 TABLET, EXTENDED RELEASE ORAL ONCE
Status: COMPLETED | OUTPATIENT
Start: 2020-07-26 | End: 2020-07-26

## 2020-07-26 RX ORDER — FLUCONAZOLE 2 MG/ML
200 INJECTION, SOLUTION INTRAVENOUS EVERY 24 HOURS
Status: DISCONTINUED | OUTPATIENT
Start: 2020-07-26 | End: 2020-07-27

## 2020-07-26 NOTE — PROGRESS NOTES
[facility] - [facility]    Progress Note    Larry [facility] Patient Status:  Inpatient    3/23/1940 MRN [id number]   Location [facility] 4W/SW/SE Attending Oskar Baires MD   Hosp Day # 7 PCP German Bradford MD, Dain King MD        Subjective:   Jagdeep Parikh Intravenous, Once  [COMPLETED] famoTIDine (PEPCID) injection 20 mg, 20 mg, Intravenous, Once  [COMPLETED] G.I. cocktail (Mylanta/dicyclomine/lidocaine 2% viscous), , Oral, Once  [COMPLETED] iopamidol (ISOVUE-M) 76 % injection 100 mL, 100 mL, Intravenous, O and Plan:   Acute gastric ulcer with perforation ([address]  HD#7  Clinically doing well    Perforation appears to have sealed spontaneously by clinical course and gastrografin UGI  Repeat CT showed new air and fluid collection between stomach and liver - very

## 2020-07-26 NOTE — PROGRESS NOTES
[place] [facility] - [facility]    Progress Note    Javier Guerra Patient Status:  Inpatient    3/23/1940 MRN [id number]   Location [facility] 4W/SW/SE Attending Homero Borges MD   Hosp Day # 7 PCP Garry Leung MD, Manjinder Kowalski MD       Subjective:   Lazarus Barnes diverticulosis. 4. Cholelithiasis without CT evidence of acute complication. 5. Hepatic steatosis. 6. Lesser incidental findings as above.     Dictated by (CST): Shari Archer MD on 7/25/2020 at 9:51 AM     Finalized by (CST): Shari Archer MD on 7/2

## 2020-07-26 NOTE — PLAN OF CARE
Problem: Patient/Family Goals  Goal: Patient/Family Long Term Goal  Description  Patient's Long Term Goal: Go home upon discharge   Interventions   -Monitor labs  -Control Pain  -Attentive listening   -Patient centered care   -Safety     7/26/2020 1129 b Utilize distraction and/or relaxation techniques  - Monitor for opioid side effects  - Notify MD/LIP if interventions unsuccessful or patient reports new pain  - Anticipate increased pain with activity and pre-medicate as appropriate  7/26/2020 1129 by Mor

## 2020-07-27 ENCOUNTER — APPOINTMENT (OUTPATIENT)
Dept: CT IMAGING | Facility: HOSPITAL | Age: 80
DRG: 381 | End: 2020-07-27
Attending: RADIOLOGY
Payer: MEDICARE

## 2020-07-27 VITALS
HEIGHT: 63 IN | OXYGEN SATURATION: 95 % | DIASTOLIC BLOOD PRESSURE: 81 MMHG | TEMPERATURE: 98 F | HEART RATE: 88 BPM | RESPIRATION RATE: 16 BRPM | WEIGHT: 160 LBS | SYSTOLIC BLOOD PRESSURE: 134 MMHG | BODY MASS INDEX: 28.35 KG/M2

## 2020-07-27 LAB
ANION GAP SERPL CALC-SCNC: 4 MMOL/L (ref 0–18)
BASOPHILS # BLD AUTO: 0.05 X10(3) UL (ref 0–0.2)
BASOPHILS NFR BLD AUTO: 0.7 %
BUN BLD-MCNC: 4 MG/DL (ref 7–18)
BUN/CREAT SERPL: 4.3 (ref 10–20)
CALCIUM BLD-MCNC: 8.8 MG/DL (ref 8.5–10.1)
CHLORIDE SERPL-SCNC: 109 MMOL/L (ref 98–112)
CO2 SERPL-SCNC: 28 MMOL/L (ref 21–32)
CREAT BLD-MCNC: 0.94 MG/DL (ref 0.55–1.02)
DEPRECATED RDW RBC AUTO: 45.4 FL (ref 35.1–46.3)
EOSINOPHIL # BLD AUTO: 0.33 X10(3) UL (ref 0–0.7)
EOSINOPHIL NFR BLD AUTO: 4.7 %
ERYTHROCYTE [DISTWIDTH] IN BLOOD BY AUTOMATED COUNT: 13.4 % (ref 11–15)
GLUCOSE BLD-MCNC: 102 MG/DL (ref 70–99)
HAV IGM SER QL: 2 MG/DL (ref 1.6–2.6)
HCT VFR BLD AUTO: 40.5 % (ref 35–48)
HGB BLD-MCNC: 13.6 G/DL (ref 12–16)
IMM GRANULOCYTES # BLD AUTO: 0.03 X10(3) UL (ref 0–1)
IMM GRANULOCYTES NFR BLD: 0.4 %
INR BLD: 1.16 (ref 0.9–1.2)
LYMPHOCYTES # BLD AUTO: 2.76 X10(3) UL (ref 1–4)
LYMPHOCYTES NFR BLD AUTO: 39.1 %
MCH RBC QN AUTO: 31.3 PG (ref 26–34)
MCHC RBC AUTO-ENTMCNC: 33.6 G/DL (ref 31–37)
MCV RBC AUTO: 93.3 FL (ref 80–100)
MONOCYTES # BLD AUTO: 0.51 X10(3) UL (ref 0.1–1)
MONOCYTES NFR BLD AUTO: 7.2 %
NEUTROPHILS # BLD AUTO: 3.37 X10 (3) UL (ref 1.5–7.7)
NEUTROPHILS # BLD AUTO: 3.37 X10(3) UL (ref 1.5–7.7)
NEUTROPHILS NFR BLD AUTO: 47.9 %
OSMOLALITY SERPL CALC.SUM OF ELEC: 289 MOSM/KG (ref 275–295)
PHOSPHATE SERPL-MCNC: 3.1 MG/DL (ref 2.5–4.9)
PLATELET # BLD AUTO: 307 10(3)UL (ref 150–450)
POTASSIUM SERPL-SCNC: 4.1 MMOL/L (ref 3.5–5.1)
PROTHROMBIN TIME: 14.6 SECONDS (ref 11.8–14.5)
RBC # BLD AUTO: 4.34 X10(6)UL (ref 3.8–5.3)
SODIUM SERPL-SCNC: 141 MMOL/L (ref 136–145)
WBC # BLD AUTO: 7.1 X10(3) UL (ref 4–11)

## 2020-07-27 PROCEDURE — 77012 CT SCAN FOR NEEDLE BIOPSY: CPT | Performed by: RADIOLOGY

## 2020-07-27 PROCEDURE — 99152 MOD SED SAME PHYS/QHP 5/>YRS: CPT | Performed by: RADIOLOGY

## 2020-07-27 PROCEDURE — 99239 HOSP IP/OBS DSCHRG MGMT >30: CPT | Performed by: HOSPITALIST

## 2020-07-27 PROCEDURE — 0W9G3ZX DRAINAGE OF PERITONEAL CAVITY, PERCUTANEOUS APPROACH, DIAGNOSTIC: ICD-10-PCS | Performed by: RADIOLOGY

## 2020-07-27 PROCEDURE — 10160 PNXR ASPIR ABSC HMTMA BULLA: CPT | Performed by: RADIOLOGY

## 2020-07-27 RX ORDER — SODIUM CHLORIDE 9 MG/ML
INJECTION, SOLUTION INTRAVENOUS CONTINUOUS
Status: DISCONTINUED | OUTPATIENT
Start: 2020-07-27 | End: 2020-07-27

## 2020-07-27 RX ORDER — PANTOPRAZOLE SODIUM 40 MG/1
40 TABLET, DELAYED RELEASE ORAL DAILY
Qty: 30 TABLET | Refills: 0 | Status: SHIPPED | OUTPATIENT
Start: 2020-07-27 | End: 2020-10-06 | Stop reason: DRUGHIGH

## 2020-07-27 RX ORDER — FLUCONAZOLE 200 MG/1
200 TABLET ORAL DAILY
Qty: 7 TABLET | Refills: 0 | Status: SHIPPED | OUTPATIENT
Start: 2020-07-27 | End: 2020-09-24 | Stop reason: ALTCHOICE

## 2020-07-27 RX ORDER — MIDAZOLAM HYDROCHLORIDE 1 MG/ML
INJECTION INTRAMUSCULAR; INTRAVENOUS
Status: DISCONTINUED
Start: 2020-07-27 | End: 2020-07-27

## 2020-07-27 RX ORDER — FLUMAZENIL 0.1 MG/ML
0.2 INJECTION, SOLUTION INTRAVENOUS AS NEEDED
Status: DISCONTINUED | OUTPATIENT
Start: 2020-07-27 | End: 2020-07-27

## 2020-07-27 RX ORDER — MIDAZOLAM HYDROCHLORIDE 1 MG/ML
1 INJECTION INTRAMUSCULAR; INTRAVENOUS EVERY 5 MIN PRN
Status: DISCONTINUED | OUTPATIENT
Start: 2020-07-27 | End: 2020-07-27

## 2020-07-27 RX ORDER — MIDAZOLAM HYDROCHLORIDE 1 MG/ML
INJECTION INTRAMUSCULAR; INTRAVENOUS
Status: COMPLETED | OUTPATIENT
Start: 2020-07-27 | End: 2020-07-27

## 2020-07-27 RX ORDER — AMOXICILLIN AND CLAVULANATE POTASSIUM 875; 125 MG/1; MG/1
1 TABLET, FILM COATED ORAL 2 TIMES DAILY
Qty: 20 TABLET | Refills: 0 | Status: SHIPPED | OUTPATIENT
Start: 2020-07-27 | End: 2020-08-06

## 2020-07-27 RX ORDER — NALOXONE HYDROCHLORIDE 0.4 MG/ML
80 INJECTION, SOLUTION INTRAMUSCULAR; INTRAVENOUS; SUBCUTANEOUS AS NEEDED
Status: DISCONTINUED | OUTPATIENT
Start: 2020-07-27 | End: 2020-07-27

## 2020-07-27 NOTE — BRIEF PROCEDURE NOTE
[facility]  Procedure Note    Prince Cesar Patient Status:  Inpatient    3/23/1940 MRN [id number]   Location [facility] 4W/SW/SE Attending Sergio Parker MD   Hosp Day # 8 PCP Unknown Anthony VITALE, Bettie Adair MD     Procedure: CT-guide

## 2020-07-27 NOTE — PLAN OF CARE
Problem: Patient/Family Goals  Goal: Patient/Family Long Term Goal  Description  Patient's Long Term Goal: Go home upon discharge   Interventions   -Monitor labs  -Control Pain  -Attentive listening   -Patient centered care   -Safety     Outcome: Adequat GASTROINTESTINAL - ADULT  Goal: Minimal or absence of nausea and vomiting  Description  INTERVENTIONS:  - Maintain adequate hydration with IV or PO as ordered and tolerated  - Nasogastric tube to low intermittent suction as ordered  - Evaluate effectivenes

## 2020-07-27 NOTE — PROGRESS NOTES
Pt dc follow up/instruction reviewed with patient and . Verbalized understanding. Dc'ed with  and belongings in stable condition.

## 2020-07-27 NOTE — PROGRESS NOTES
[place] [facility] - [facility]    Progress Note    True Bruce Patient Status:  Inpatient    3/23/1940 MRN [id number]   Location [facility] 4W/SW/SE Attending Luna Britton MD   Hosp Day # 8 PCP Arjun Holcomb MD, Sly Sebastian MD        Subjective:   Raul Skinner Once  Heparin Sodium (Porcine) 5000 UNIT/ML injection 5,000 Units, 5,000 Units, Subcutaneous, 2 times per day  phenol (CHLORASEPTIC) 1.4 % oral liquid spray, , Oral, Q2H PRN  [COMPLETED] potassium chloride 40 mEq in sodium chloride 0.9% 250 mL IVPB, 40 mEq I.V.:3359]  Out: 6550 [Urine:6550]    General: No acute distress. Alert and oriented x 3. HEENT: Moist mucous membranes. Neck: No lymphadenopathy. Bhatti Cliche Respiratory: Clear to auscultation bilaterally.      Cardiovascular: S1, S2.  Regular rate and rhythm

## 2020-07-27 NOTE — DISCHARGE SUMMARY
[facility]    Discharge Summary    Patty Fischer Patient Status:  Inpatient    3/23/1940 MRN [id number]   Location [facility] 4W/SW/SE Attending Reg Marshall MD   Hosp Day # 8 PCP Luz Castro MD, Daniele Miranda MD     Date of Admission: Positive bowel sounds. No rebound or guarding. Neurologic: No focal neurological deficits. Musculoskeletal: Moves all extremities.     Hospital Course:   Acute gastric ulcer with perforation ([address]  - per upper GI series perforation is resolving   - cont  Carbonate-Vitamin D 500-125 MG-UNIT Oral Tab  Take by mouth. !! RESTASIS MULTIDOSE 0.05 % Ophthalmic Emulsion  INSTILL 1 DROP IN BOTH EYES TWO TIMES DAILY    ! ! - Potential duplicate medications found. Please discuss with provider.               Santana Quintana Specialty:  Family Medicine  Contact information:  [address] [place] [address] [phone]                   Follow up Labs and imaging:         Time spent:  > 30 minutes    Lillian Tang  7/27/2020

## 2020-07-27 NOTE — CM/SW NOTE
Interdisciplinary Rounds: 07/27/20  Admitted: 7/19/2020 LOS: 8  Disciplines in attendance: charge nurse, staff nurse, CM, [address] and discharge plan reviewed.     Active issues needing resolution prior to discharge:     Perforated gastric ulcer

## 2020-08-20 PROBLEM — R51.9 HEADACHE AROUND THE EYES: Status: ACTIVE | Noted: 2020-08-20

## 2020-08-20 PROBLEM — H43.811 PVD (POSTERIOR VITREOUS DETACHMENT), RIGHT: Status: ACTIVE | Noted: 2020-08-20

## 2020-09-21 ENCOUNTER — PATIENT MESSAGE (OUTPATIENT)
Dept: GASTROENTEROLOGY | Facility: CLINIC | Age: 80
End: 2020-09-21

## 2020-09-22 NOTE — TELEPHONE ENCOUNTER
From: Prince Guerrero  To: Philip Dunham MD  Sent: 9/21/2020 9:55 AM CDT  Subject: Non-Urgent Medical Question    The details of tests are in My Chart. Is it necessary for me to print these or can you access My Chart?

## 2020-09-22 NOTE — TELEPHONE ENCOUNTER
Patient asking if the doctor can view X-Ray results in the computer  from July for her upcoming appointment. I advised patient that yes, he can view results in the computer. Patient voiced understanding.

## 2020-09-24 ENCOUNTER — TELEPHONE (OUTPATIENT)
Dept: GASTROENTEROLOGY | Facility: CLINIC | Age: 80
End: 2020-09-24

## 2020-09-24 ENCOUNTER — OFFICE VISIT (OUTPATIENT)
Dept: GASTROENTEROLOGY | Facility: CLINIC | Age: 80
End: 2020-09-24
Payer: MEDICARE

## 2020-09-24 VITALS
BODY MASS INDEX: 27.65 KG/M2 | WEIGHT: 160 LBS | HEART RATE: 90 BPM | HEIGHT: 63.8 IN | DIASTOLIC BLOOD PRESSURE: 71 MMHG | SYSTOLIC BLOOD PRESSURE: 168 MMHG

## 2020-09-24 DIAGNOSIS — K25.5 GASTRIC ULCER WITH PERFORATION, UNSPECIFIED CHRONICITY (HCC): Primary | ICD-10-CM

## 2020-09-24 DIAGNOSIS — K25.9 GASTRIC ULCER, UNSPECIFIED CHRONICITY, UNSPECIFIED WHETHER GASTRIC ULCER HEMORRHAGE OR PERFORATION PRESENT: Primary | ICD-10-CM

## 2020-09-24 PROCEDURE — 99213 OFFICE O/P EST LOW 20 MIN: CPT | Performed by: INTERNAL MEDICINE

## 2020-09-24 PROCEDURE — G0463 HOSPITAL OUTPT CLINIC VISIT: HCPCS | Performed by: INTERNAL MEDICINE

## 2020-09-24 NOTE — TELEPHONE ENCOUNTER
Scheduled for:  EGD 58228  Provider Name:    Date:  10/2/20  Location:  [facility]  Sedation:MAC    Time:  230pm pt told will get call for arrival time  Prep:  NPO after midnight  Meds/Allergies Reconciled?:  Physician reviewed    Diagnosis with co

## 2020-09-24 NOTE — PROGRESS NOTES
HPI:    Patient ID: Garry Law is a 79year old female. HPI  The patient returns in follow-up today along with her . She was last seen the time of her colonoscopy in January 2020.     As per previous notes the patient has a history of sage and alcohol since her hospital discharge. The patient currently feels well. Her appetite is good. She denies nausea or vomiting.   She has occasional upper abdominal discomfort but not of the type that she experienced prior to her admission to the hosp normal and breath sounds normal. No respiratory distress. She has no wheezes. She has no rales. Abdominal: Soft. Bowel sounds are normal. She exhibits no distension and no mass. There is no hepatosplenomegaly. There is no abdominal tenderness.  There is n - 145 mmol/L       Potassium      3.5 - 5.1 mmol/L       Chloride      98 - 112 mmol/L       Carbon Dioxide, Total      21.0 - 32.0 mmol/L       ANION GAP      0 - 18 mmol/L       BUN      7 - 18 mg/dL       CREATININE      0.55 - 1.02 mg/dL       BUN/CREA 28.0   ANION GAP      0 - 18 mmol/L 5   BUN      7 - 18 mg/dL 12   CREATININE      0.55 - 1.02 mg/dL 0.93   BUN/CREAT Ratio      10.0 - 20.0 12.9   CALCIUM      8.5 - 10.1 mg/dL 8.4 (L)   CALCULATED OSMOLALITY      275 - 295 mOsm/kg 283   eGFR NON-AFR.  PRIYANKA characterized, but statistically likely represent cysts. BILIARY:            Cholelithiasis is present. There is no intraluminal dilatation, gallbladder wall thickening, or pericholecystic inflammatory stranding.   PANCREAS:      There is mild diffuse fatt vacuum disc phenomenon. 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 Sclerosis and vacuum phenomenon the sacroiliac   joints are noted bilaterally.   ABDOMINAL WALL:      Multifocal infiltration of the ventral abdominal subcutaneous fat is demonstr at 9:13 AM       Finalized by (CST): Brian Dudley MD on 7/23/2020 at 9:19 AM       PROCEDURE:  CT ABDOMEN + PELVIS (CONTRAST ONLY) (CPT=74177)     COMPARISON: None.      INDICATIONS:  Epigastric and LUQ pain and nausea since 2 am this morning     TECHN calcification. AORTA/VASCULAR:      Mild calcific atherosclerosis. No aneurysm or dissection. RETROPERITONEUM:  No mass or enlarged adenopathy.     BOWEL/MESENTERY:  Diffuse gastric wall thickening involving the gastric antrum with focal perforated ul of upper abdominal pain prompting an ER visit. A CT scan suggested a perforated gastric ulcer without generalized peritonitis. She responded to medical management. She has a history of aspirin/NSAID use but not to excess.   Family history of H. pylori is

## 2020-09-24 NOTE — PATIENT INSTRUCTIONS
1.  No aspirin or anti-inflammatory medicines. 2.  Continue pantoprazole for now. 3.  May have coffee. 4.  An occasional glass of wine or champagne would be fine.   5.  Schedule upper endoscopy for a history of a complicated ulcer with either IV sedation

## 2020-09-29 ENCOUNTER — LAB REQUISITION (OUTPATIENT)
Dept: LAB | Facility: HOSPITAL | Age: 80
End: 2020-09-29
Payer: MEDICARE

## 2020-09-29 DIAGNOSIS — Z01.818 ENCOUNTER FOR OTHER PREPROCEDURAL EXAMINATION: ICD-10-CM

## 2020-10-02 ENCOUNTER — SURGERY CENTER DOCUMENTATION (OUTPATIENT)
Dept: SURGERY | Age: 80
End: 2020-10-02

## 2020-10-02 ENCOUNTER — LAB REQUISITION (OUTPATIENT)
Dept: LAB | Facility: HOSPITAL | Age: 80
End: 2020-10-02
Payer: MEDICARE

## 2020-10-02 DIAGNOSIS — K25.9 GASTRIC ULCER, UNSPECIFIED AS ACUTE OR CHRONIC, WITHOUT HEMORRHAGE OR PERFORATION: ICD-10-CM

## 2020-10-02 PROCEDURE — 88312 SPECIAL STAINS GROUP 1: CPT | Performed by: INTERNAL MEDICINE

## 2020-10-02 PROCEDURE — 43239 EGD BIOPSY SINGLE/MULTIPLE: CPT | Performed by: INTERNAL MEDICINE

## 2020-10-02 PROCEDURE — 88305 TISSUE EXAM BY PATHOLOGIST: CPT | Performed by: INTERNAL MEDICINE

## 2020-10-02 NOTE — PROCEDURES
REJI FRANCO[facility] Endoscopy Report      Date of Procedure:  10/02/20      Preoperative Diagnosis:  History of complicated gastric ulceration (perforation)      Postoperative Diagnosis:  1.   Healed gastric ulceration  2.  3 cm hiatal hernia      Pr duodenal bulb and post bulbar regions were normal.      Impression:  1. Healed gastric antral ulceration. 2.  3 cm hiatal hernia    Recommendations:  1. Follow-up biopsy results. 2.  Treat H. pylori if positive. 3.  No aspirin/NSAIDs.       Kermit Castro

## 2020-10-06 RX ORDER — PANTOPRAZOLE SODIUM 20 MG/1
40 TABLET, DELAYED RELEASE ORAL
Qty: 90 TABLET | Refills: 3 | Status: SHIPPED | OUTPATIENT
Start: 2020-10-06 | End: 2020-10-16 | Stop reason: DRUGHIGH

## 2020-10-10 ENCOUNTER — PATIENT MESSAGE (OUTPATIENT)
Dept: GASTROENTEROLOGY | Facility: CLINIC | Age: 80
End: 2020-10-10

## 2020-10-12 NOTE — TELEPHONE ENCOUNTER
Please contact the pharmacy. The prescription should read take 20 mg each morning. Dispense #90 with refills for a year as was written.

## 2020-10-12 NOTE — TELEPHONE ENCOUNTER
Dr. Ila Avila you want patient to take 40mg of Pantoprazole or wean down to 20mg daily.     Please advise    Thank you

## 2020-10-12 NOTE — TELEPHONE ENCOUNTER
From: Bekah Long  To: Inderjit Villatoro MD  Sent: 10/10/2020 3:00 PM CDT  Subject: Prescription Question    I recently received my prescription you ordered for Pantoprazole (20MG). I am currently taking 40MG.  It was my understanding that you wan

## 2020-10-13 RX ORDER — PANTOPRAZOLE SODIUM 20 MG/1
20 TABLET, DELAYED RELEASE ORAL
Qty: 90 TABLET | Refills: 3 | Status: SHIPPED | OUTPATIENT
Start: 2020-10-13 | End: 2021-08-27

## 2020-10-13 NOTE — TELEPHONE ENCOUNTER
Dr. Trista Kaur-    If appropriate, please review and sign pended order, thank you. Once signed, will inform the pharmacy to d/c previous order, thank you.

## 2021-01-19 NOTE — PLAN OF CARE
Patient called back. She states the only medication her insurance will cover in Lactulose.   Update to Kelsie.      Problem: Patient/Family Goals  Goal: Patient/Family Long Term Goal  Description  Patient's Long Term Goal: Go home upon discharge   Interventions   -Monitor labs  -Control Pain  -Attentive listening   -Patient centered care   -Safety     Outcome: Mati absence of nausea and vomiting  Description  INTERVENTIONS:  - Maintain adequate hydration with IV or PO as ordered and tolerated  - Nasogastric tube to low intermittent suction as ordered  - Evaluate effectiveness of ordered antiemetic medications  - Prov

## 2021-03-10 ENCOUNTER — PATIENT MESSAGE (OUTPATIENT)
Dept: GASTROENTEROLOGY | Facility: CLINIC | Age: 81
End: 2021-03-10

## 2021-03-10 NOTE — TELEPHONE ENCOUNTER
From: Chance Batista  To: Megan Cheung MD  Sent: 3/10/2021 10:49 AM CST  Subject: Other    Is it possible to receive a code for the COVID 19 vaccine from you. I'll be 81 this month.

## 2021-04-27 ENCOUNTER — ORDER TRANSCRIPTION (OUTPATIENT)
Dept: ADMINISTRATIVE | Facility: HOSPITAL | Age: 81
End: 2021-04-27

## 2021-04-27 DIAGNOSIS — Z13.9 ENCOUNTER FOR SCREENING: Primary | ICD-10-CM

## 2021-05-03 DIAGNOSIS — Z12.31 BREAST CANCER SCREENING BY MAMMOGRAM: Primary | ICD-10-CM

## 2021-05-07 ENCOUNTER — HOSPITAL ENCOUNTER (OUTPATIENT)
Dept: CT IMAGING | Age: 81
Discharge: HOME | End: 2021-05-07
Attending: FAMILY MEDICINE

## 2021-05-07 DIAGNOSIS — Z13.6 SCREENING FOR CARDIOVASCULAR CONDITION: ICD-10-CM

## 2021-05-17 ENCOUNTER — HOSPITAL ENCOUNTER (OUTPATIENT)
Dept: MAMMOGRAPHY | Facility: HOSPITAL | Age: 81
Discharge: HOME | End: 2021-05-17
Attending: FAMILY MEDICINE
Payer: MEDICARE

## 2021-05-17 ENCOUNTER — HOSPITAL ENCOUNTER (OUTPATIENT)
Dept: ULTRASOUND IMAGING | Facility: HOSPITAL | Age: 81
Discharge: HOME | End: 2021-05-17
Attending: FAMILY MEDICINE
Payer: MEDICARE

## 2021-05-17 DIAGNOSIS — Z12.31 BREAST CANCER SCREENING BY MAMMOGRAM: ICD-10-CM

## 2021-05-17 DIAGNOSIS — Z13.9 ENCOUNTER FOR SCREENING: ICD-10-CM

## 2021-05-17 PROCEDURE — 77063 BREAST TOMOSYNTHESIS BI: CPT | Performed by: FAMILY MEDICINE

## 2021-05-17 PROCEDURE — 77067 SCR MAMMO BI INCL CAD: CPT | Performed by: FAMILY MEDICINE

## 2021-08-26 NOTE — TELEPHONE ENCOUNTER
Pantoprazole 20 mg #90 with 3 refills was prescribed in October 2020. The patient should have adequate refills through October. She should also make a follow-up office appointment in the fall for additional refills.   Alternatively the prescription could

## 2021-08-26 NOTE — TELEPHONE ENCOUNTER
Requested Prescriptions     Pending Prescriptions Disp Refills   • PANTOPRAZOLE 20 MG Oral Tab EC [Pharmacy Med Name: PANTOPRAZOLE SOD 20MG EC TABLET] 180 tablet 0     Sig: TAKE 2 TABLETS BY MOUTH IN  THE MORNING BEFORE  BREAKFAST     LOV: 09/24/2020  Last

## 2021-08-27 RX ORDER — PANTOPRAZOLE SODIUM 20 MG/1
TABLET, DELAYED RELEASE ORAL
Qty: 180 TABLET | Refills: 0 | Status: SHIPPED | OUTPATIENT
Start: 2021-08-27 | End: 2021-11-08

## 2021-11-08 ENCOUNTER — OFFICE VISIT (OUTPATIENT)
Dept: GASTROENTEROLOGY | Facility: CLINIC | Age: 81
End: 2021-11-08
Payer: MEDICARE

## 2021-11-08 VITALS
SYSTOLIC BLOOD PRESSURE: 144 MMHG | HEART RATE: 88 BPM | HEIGHT: 63 IN | BODY MASS INDEX: 28.35 KG/M2 | DIASTOLIC BLOOD PRESSURE: 86 MMHG | WEIGHT: 160 LBS

## 2021-11-08 DIAGNOSIS — Z87.11 HISTORY OF GASTRIC ULCER: Primary | ICD-10-CM

## 2021-11-08 PROCEDURE — 99213 OFFICE O/P EST LOW 20 MIN: CPT | Performed by: INTERNAL MEDICINE

## 2021-11-08 RX ORDER — PANTOPRAZOLE SODIUM 20 MG/1
20 TABLET, DELAYED RELEASE ORAL
Qty: 90 TABLET | Refills: 3 | Status: SHIPPED | OUTPATIENT
Start: 2021-11-08

## 2021-11-08 NOTE — PROGRESS NOTES
Subjective:   Patient ID: Elicia Don is a [de-identified]year old female. HPI  The patient returns in follow-up today.   She was last seen at endoscopy in October 2020.     As per previous notes the patient has a history of adenomatous colon polyps including remained abstinent from aspirin and NSAIDs. She is taking pantoprazole once daily. Her thyroid replacement dose has been increased to 88 mcg daily.     History/Other:   Review of Systems   See above    Wt Readings from Last 6 Encounters:  11/08/21 : 1 Musculoskeletal:      Cervical back: Neck supple. Lymphadenopathy:      Cervical: No cervical adenopathy. Neurological:      Mental Status: She is alert and oriented to person, place, and time.    Psychiatric:         Behavior: Behavior normal.

## 2021-11-08 NOTE — PATIENT INSTRUCTIONS
1.  Continue pantoprazole once daily. 2.  Further refills can be given by your primary care physician. I would be happy to do so in the future if you would like as well. 3.  Please contact me with any digestive symptoms.

## 2022-02-09 ENCOUNTER — HOSPITAL ENCOUNTER (OUTPATIENT)
Dept: BONE DENSITY | Age: 82
Discharge: HOME | End: 2022-02-09
Attending: INTERNAL MEDICINE
Payer: MEDICARE

## 2022-02-09 DIAGNOSIS — Z78.0 ASYMPTOMATIC MENOPAUSE: ICD-10-CM

## 2022-02-09 PROCEDURE — 77080 DXA BONE DENSITY AXIAL: CPT | Performed by: INTERNAL MEDICINE

## 2022-05-26 ENCOUNTER — HOSPITAL ENCOUNTER (EMERGENCY)
Facility: HOSPITAL | Age: 82
Discharge: HOME | End: 2022-05-26
Attending: EMERGENCY MEDICINE
Payer: MEDICARE

## 2022-05-26 ENCOUNTER — HOSPITAL ENCOUNTER (OUTPATIENT)
Age: 82
Discharge: HOME | End: 2022-05-26
Attending: EMERGENCY MEDICINE
Payer: MEDICARE

## 2022-05-26 ENCOUNTER — APPOINTMENT (OUTPATIENT)
Dept: ULTRASOUND IMAGING | Facility: HOSPITAL | Age: 82
End: 2022-05-26
Attending: EMERGENCY MEDICINE
Payer: MEDICARE

## 2022-05-26 VITALS
WEIGHT: 160 LBS | TEMPERATURE: 98 F | OXYGEN SATURATION: 96 % | SYSTOLIC BLOOD PRESSURE: 172 MMHG | RESPIRATION RATE: 16 BRPM | DIASTOLIC BLOOD PRESSURE: 101 MMHG | HEIGHT: 63.5 IN | BODY MASS INDEX: 28 KG/M2 | HEART RATE: 84 BPM

## 2022-05-26 VITALS
OXYGEN SATURATION: 96 % | SYSTOLIC BLOOD PRESSURE: 154 MMHG | TEMPERATURE: 99 F | RESPIRATION RATE: 22 BRPM | DIASTOLIC BLOOD PRESSURE: 81 MMHG | HEART RATE: 86 BPM

## 2022-05-26 DIAGNOSIS — R10.9 FLANK PAIN: ICD-10-CM

## 2022-05-26 DIAGNOSIS — R10.11 ABDOMINAL PAIN, RIGHT UPPER QUADRANT: Primary | ICD-10-CM

## 2022-05-26 DIAGNOSIS — K80.50 BILIARY COLIC: Primary | ICD-10-CM

## 2022-05-26 LAB
ALBUMIN SERPL-MCNC: 3.6 G/DL (ref 3.4–5)
ALP LIVER SERPL-CCNC: 54 U/L
ALT SERPL-CCNC: 17 U/L
ANION GAP SERPL CALC-SCNC: 8 MMOL/L (ref 0–18)
AST SERPL-CCNC: 23 U/L (ref 15–37)
BASOPHILS # BLD AUTO: 0.03 X10(3) UL (ref 0–0.2)
BASOPHILS NFR BLD AUTO: 0.5 %
BILIRUB DIRECT SERPL-MCNC: 0.3 MG/DL (ref 0–0.2)
BILIRUB SERPL-MCNC: 1.3 MG/DL (ref 0.1–2)
BILIRUB UR QL: NEGATIVE
BUN BLD-MCNC: 12 MG/DL (ref 7–18)
BUN/CREAT SERPL: 14.5 (ref 10–20)
CALCIUM BLD-MCNC: 9.2 MG/DL (ref 8.5–10.1)
CHLORIDE SERPL-SCNC: 102 MMOL/L (ref 98–112)
CLARITY UR: CLEAR
CO2 SERPL-SCNC: 26 MMOL/L (ref 21–32)
COLOR UR: YELLOW
CREAT BLD-MCNC: 0.83 MG/DL
DEPRECATED RDW RBC AUTO: 43.8 FL (ref 35.1–46.3)
EOSINOPHIL # BLD AUTO: 0.13 X10(3) UL (ref 0–0.7)
EOSINOPHIL NFR BLD AUTO: 2.2 %
ERYTHROCYTE [DISTWIDTH] IN BLOOD BY AUTOMATED COUNT: 12.7 % (ref 11–15)
GLUCOSE BLD-MCNC: 117 MG/DL (ref 70–99)
GLUCOSE UR-MCNC: NEGATIVE MG/DL
HCT VFR BLD AUTO: 44.6 %
HGB BLD-MCNC: 14.9 G/DL
IMM GRANULOCYTES # BLD AUTO: 0.01 X10(3) UL (ref 0–1)
IMM GRANULOCYTES NFR BLD: 0.2 %
KETONES UR-MCNC: 40 MG/DL
LEUKOCYTE ESTERASE UR QL STRIP.AUTO: NEGATIVE
LIPASE SERPL-CCNC: 114 U/L (ref 73–393)
LYMPHOCYTES # BLD AUTO: 1.69 X10(3) UL (ref 1–4)
LYMPHOCYTES NFR BLD AUTO: 29 %
MCH RBC QN AUTO: 31.3 PG (ref 26–34)
MCHC RBC AUTO-ENTMCNC: 33.4 G/DL (ref 31–37)
MCV RBC AUTO: 93.7 FL
MONOCYTES # BLD AUTO: 0.45 X10(3) UL (ref 0.1–1)
MONOCYTES NFR BLD AUTO: 7.7 %
NEUTROPHILS # BLD AUTO: 3.51 X10 (3) UL (ref 1.5–7.7)
NEUTROPHILS # BLD AUTO: 3.51 X10(3) UL (ref 1.5–7.7)
NEUTROPHILS NFR BLD AUTO: 60.4 %
NITRITE UR QL STRIP.AUTO: NEGATIVE
OSMOLALITY SERPL CALC.SUM OF ELEC: 283 MOSM/KG (ref 275–295)
PH UR: 6.5 [PH] (ref 5–8)
PLATELET # BLD AUTO: 205 10(3)UL (ref 150–450)
POTASSIUM SERPL-SCNC: 3.4 MMOL/L (ref 3.5–5.1)
PROT SERPL-MCNC: 7.5 G/DL (ref 6.4–8.2)
RBC # BLD AUTO: 4.76 X10(6)UL
SODIUM SERPL-SCNC: 136 MMOL/L (ref 136–145)
SP GR UR STRIP: 1.02 (ref 1–1.03)
UROBILINOGEN UR STRIP-ACNC: 0.2
WBC # BLD AUTO: 5.8 X10(3) UL (ref 4–11)
YEAST UR QL: PRESENT /HPF

## 2022-05-26 PROCEDURE — 99284 EMERGENCY DEPT VISIT MOD MDM: CPT

## 2022-05-26 PROCEDURE — 99212 OFFICE O/P EST SF 10 MIN: CPT

## 2022-05-26 PROCEDURE — 83690 ASSAY OF LIPASE: CPT | Performed by: EMERGENCY MEDICINE

## 2022-05-26 PROCEDURE — 80048 BASIC METABOLIC PNL TOTAL CA: CPT | Performed by: EMERGENCY MEDICINE

## 2022-05-26 PROCEDURE — 76705 ECHO EXAM OF ABDOMEN: CPT | Performed by: EMERGENCY MEDICINE

## 2022-05-26 PROCEDURE — 85025 COMPLETE CBC W/AUTO DIFF WBC: CPT | Performed by: EMERGENCY MEDICINE

## 2022-05-26 PROCEDURE — 81001 URINALYSIS AUTO W/SCOPE: CPT | Performed by: EMERGENCY MEDICINE

## 2022-05-26 PROCEDURE — 99213 OFFICE O/P EST LOW 20 MIN: CPT

## 2022-05-26 PROCEDURE — 36415 COLL VENOUS BLD VENIPUNCTURE: CPT

## 2022-05-26 PROCEDURE — 80076 HEPATIC FUNCTION PANEL: CPT | Performed by: EMERGENCY MEDICINE

## 2022-05-26 RX ORDER — TRAMADOL HYDROCHLORIDE 50 MG/1
50 TABLET ORAL EVERY 6 HOURS PRN
Qty: 10 TABLET | Refills: 0 | Status: SHIPPED | OUTPATIENT
Start: 2022-05-26 | End: 2022-06-02

## 2022-05-26 NOTE — ED INITIAL ASSESSMENT (HPI)
Patient to ER from [address] [place] [address] for c/o middle back pain starting Sunday. Patient states IC sent for gallbladder work up. Denies nausea, vomiting diarrhea, trauma.

## 2022-07-09 ENCOUNTER — LAB ENCOUNTER (OUTPATIENT)
Dept: LAB | Age: 82
End: 2022-07-09
Attending: SURGERY
Payer: MEDICARE

## 2022-07-09 DIAGNOSIS — Z01.818 PREOP TESTING: ICD-10-CM

## 2022-07-10 LAB — SARS-COV-2 RNA RESP QL NAA+PROBE: NOT DETECTED

## 2022-07-12 ENCOUNTER — HOSPITAL ENCOUNTER (OUTPATIENT)
Facility: HOSPITAL | Age: 82
Setting detail: HOSPITAL OUTPATIENT SURGERY
Discharge: HOME | End: 2022-07-12
Attending: SURGERY | Admitting: SURGERY
Payer: MEDICARE

## 2022-07-12 ENCOUNTER — ANESTHESIA EVENT (OUTPATIENT)
Dept: SURGERY | Facility: HOSPITAL | Age: 82
End: 2022-07-12
Payer: MEDICARE

## 2022-07-12 ENCOUNTER — APPOINTMENT (OUTPATIENT)
Dept: GENERAL RADIOLOGY | Facility: HOSPITAL | Age: 82
End: 2022-07-12
Attending: SURGERY
Payer: MEDICARE

## 2022-07-12 ENCOUNTER — ANESTHESIA (OUTPATIENT)
Dept: SURGERY | Facility: HOSPITAL | Age: 82
End: 2022-07-12
Payer: MEDICARE

## 2022-07-12 VITALS
HEART RATE: 73 BPM | OXYGEN SATURATION: 94 % | WEIGHT: 145 LBS | HEIGHT: 64 IN | TEMPERATURE: 97 F | SYSTOLIC BLOOD PRESSURE: 118 MMHG | BODY MASS INDEX: 24.75 KG/M2 | RESPIRATION RATE: 18 BRPM | DIASTOLIC BLOOD PRESSURE: 61 MMHG

## 2022-07-12 DIAGNOSIS — Z01.818 PREOP TESTING: Primary | ICD-10-CM

## 2022-07-12 PROCEDURE — 74300 X-RAY BILE DUCTS/PANCREAS: CPT | Performed by: SURGERY

## 2022-07-12 PROCEDURE — 88304 TISSUE EXAM BY PATHOLOGIST: CPT | Performed by: SURGERY

## 2022-07-12 PROCEDURE — BF14YZZ FLUOROSCOPY OF GALLBLADDER, BILE DUCTS AND PANCREATIC DUCTS USING OTHER CONTRAST: ICD-10-PCS | Performed by: SURGERY

## 2022-07-12 PROCEDURE — 0FT44ZZ RESECTION OF GALLBLADDER, PERCUTANEOUS ENDOSCOPIC APPROACH: ICD-10-PCS | Performed by: SURGERY

## 2022-07-12 RX ORDER — SODIUM CHLORIDE, SODIUM LACTATE, POTASSIUM CHLORIDE, CALCIUM CHLORIDE 600; 310; 30; 20 MG/100ML; MG/100ML; MG/100ML; MG/100ML
INJECTION, SOLUTION INTRAVENOUS CONTINUOUS
Status: DISCONTINUED | OUTPATIENT
Start: 2022-07-12 | End: 2022-07-12

## 2022-07-12 RX ORDER — DEXAMETHASONE SODIUM PHOSPHATE 4 MG/ML
VIAL (ML) INJECTION AS NEEDED
Status: DISCONTINUED | OUTPATIENT
Start: 2022-07-12 | End: 2022-07-12 | Stop reason: SURG

## 2022-07-12 RX ORDER — HYDROMORPHONE HYDROCHLORIDE 1 MG/ML
0.4 INJECTION, SOLUTION INTRAMUSCULAR; INTRAVENOUS; SUBCUTANEOUS EVERY 5 MIN PRN
Status: DISCONTINUED | OUTPATIENT
Start: 2022-07-12 | End: 2022-07-12

## 2022-07-12 RX ORDER — BUPIVACAINE HYDROCHLORIDE 2.5 MG/ML
INJECTION, SOLUTION EPIDURAL; INFILTRATION; INTRACAUDAL AS NEEDED
Status: DISCONTINUED | OUTPATIENT
Start: 2022-07-12 | End: 2022-07-12 | Stop reason: HOSPADM

## 2022-07-12 RX ORDER — ACETAMINOPHEN 500 MG
1000 TABLET ORAL ONCE
Status: COMPLETED | OUTPATIENT
Start: 2022-07-12 | End: 2022-07-12

## 2022-07-12 RX ORDER — CEFAZOLIN SODIUM/WATER 2 G/20 ML
2 SYRINGE (ML) INTRAVENOUS ONCE
Status: COMPLETED | OUTPATIENT
Start: 2022-07-12 | End: 2022-07-12

## 2022-07-12 RX ORDER — HYDROMORPHONE HYDROCHLORIDE 1 MG/ML
0.2 INJECTION, SOLUTION INTRAMUSCULAR; INTRAVENOUS; SUBCUTANEOUS EVERY 5 MIN PRN
Status: DISCONTINUED | OUTPATIENT
Start: 2022-07-12 | End: 2022-07-12

## 2022-07-12 RX ORDER — NALOXONE HYDROCHLORIDE 0.4 MG/ML
80 INJECTION, SOLUTION INTRAMUSCULAR; INTRAVENOUS; SUBCUTANEOUS AS NEEDED
Status: DISCONTINUED | OUTPATIENT
Start: 2022-07-12 | End: 2022-07-12

## 2022-07-12 RX ORDER — HEPARIN SODIUM 1000 [USP'U]/ML
INJECTION, SOLUTION INTRAVENOUS; SUBCUTANEOUS AS NEEDED
Status: DISCONTINUED | OUTPATIENT
Start: 2022-07-12 | End: 2022-07-12 | Stop reason: HOSPADM

## 2022-07-12 RX ORDER — MORPHINE SULFATE 4 MG/ML
4 INJECTION, SOLUTION INTRAMUSCULAR; INTRAVENOUS EVERY 10 MIN PRN
Status: DISCONTINUED | OUTPATIENT
Start: 2022-07-12 | End: 2022-07-12

## 2022-07-12 RX ORDER — HYDROMORPHONE HYDROCHLORIDE 1 MG/ML
0.6 INJECTION, SOLUTION INTRAMUSCULAR; INTRAVENOUS; SUBCUTANEOUS EVERY 5 MIN PRN
Status: DISCONTINUED | OUTPATIENT
Start: 2022-07-12 | End: 2022-07-12

## 2022-07-12 RX ORDER — MORPHINE SULFATE 4 MG/ML
2 INJECTION, SOLUTION INTRAMUSCULAR; INTRAVENOUS EVERY 10 MIN PRN
Status: DISCONTINUED | OUTPATIENT
Start: 2022-07-12 | End: 2022-07-12

## 2022-07-12 RX ORDER — ROCURONIUM BROMIDE 10 MG/ML
INJECTION, SOLUTION INTRAVENOUS AS NEEDED
Status: DISCONTINUED | OUTPATIENT
Start: 2022-07-12 | End: 2022-07-12 | Stop reason: SURG

## 2022-07-12 RX ORDER — MORPHINE SULFATE 10 MG/ML
6 INJECTION, SOLUTION INTRAMUSCULAR; INTRAVENOUS EVERY 10 MIN PRN
Status: DISCONTINUED | OUTPATIENT
Start: 2022-07-12 | End: 2022-07-12

## 2022-07-12 RX ORDER — PHENYLEPHRINE HCL 10 MG/ML
VIAL (ML) INJECTION AS NEEDED
Status: DISCONTINUED | OUTPATIENT
Start: 2022-07-12 | End: 2022-07-12 | Stop reason: SURG

## 2022-07-12 RX ORDER — GLYCOPYRROLATE 0.2 MG/ML
INJECTION, SOLUTION INTRAMUSCULAR; INTRAVENOUS AS NEEDED
Status: DISCONTINUED | OUTPATIENT
Start: 2022-07-12 | End: 2022-07-12 | Stop reason: SURG

## 2022-07-12 RX ORDER — METOCLOPRAMIDE HYDROCHLORIDE 5 MG/ML
10 INJECTION INTRAMUSCULAR; INTRAVENOUS EVERY 8 HOURS PRN
Status: DISCONTINUED | OUTPATIENT
Start: 2022-07-12 | End: 2022-07-12

## 2022-07-12 RX ORDER — ONDANSETRON 2 MG/ML
INJECTION INTRAMUSCULAR; INTRAVENOUS AS NEEDED
Status: DISCONTINUED | OUTPATIENT
Start: 2022-07-12 | End: 2022-07-12 | Stop reason: SURG

## 2022-07-12 RX ORDER — TRAMADOL HYDROCHLORIDE 50 MG/1
50 TABLET ORAL EVERY 6 HOURS PRN
Qty: 10 TABLET | Refills: 0 | Status: SHIPPED | OUTPATIENT
Start: 2022-07-12

## 2022-07-12 RX ADMIN — ONDANSETRON 4 MG: 2 INJECTION INTRAMUSCULAR; INTRAVENOUS at 11:02:00

## 2022-07-12 RX ADMIN — PHENYLEPHRINE HCL 100 MCG: 10 MG/ML VIAL (ML) INJECTION at 11:20:00

## 2022-07-12 RX ADMIN — DEXAMETHASONE SODIUM PHOSPHATE 4 MG: 4 MG/ML VIAL (ML) INJECTION at 11:02:00

## 2022-07-12 RX ADMIN — SODIUM CHLORIDE, SODIUM LACTATE, POTASSIUM CHLORIDE, CALCIUM CHLORIDE: 600; 310; 30; 20 INJECTION, SOLUTION INTRAVENOUS at 12:19:00

## 2022-07-12 RX ADMIN — CEFAZOLIN SODIUM/WATER 2 G: 2 G/20 ML SYRINGE (ML) INTRAVENOUS at 11:07:00

## 2022-07-12 RX ADMIN — PHENYLEPHRINE HCL 100 MCG: 10 MG/ML VIAL (ML) INJECTION at 11:12:00

## 2022-07-12 RX ADMIN — ROCURONIUM BROMIDE 38 MG: 10 INJECTION, SOLUTION INTRAVENOUS at 11:04:00

## 2022-07-12 RX ADMIN — SODIUM CHLORIDE, SODIUM LACTATE, POTASSIUM CHLORIDE, CALCIUM CHLORIDE: 600; 310; 30; 20 INJECTION, SOLUTION INTRAVENOUS at 10:57:00

## 2022-07-12 RX ADMIN — GLYCOPYRROLATE 0.2 MG: 0.2 INJECTION, SOLUTION INTRAMUSCULAR; INTRAVENOUS at 11:02:00

## 2022-07-12 NOTE — BRIEF OP NOTE
Pre-Operative Diagnosis: cholelithiasis     Post-Operative Diagnosis: cholelithiasis      Procedure Performed:   Laparoscopic cholecystectomy with cholangiogram,     Surgeon(s) and Role:     * Angelic Castro MD - Primary    Assistant(s):  Surgical Assistant.: Ethan Burris CSA     Surgical Findings: normal cholangiogram     Specimen: gallbladder to pathology     Estimated Blood Loss: Blood Output: 5 mL (7/12/2022 12:19 PM)        Pedro Davis MD  7/12/2022  1:51 PM

## 2022-07-12 NOTE — ANESTHESIA PROCEDURE NOTES
Airway  Date/Time: 7/12/2022 11:06 AM  Urgency: Elective    Airway not difficult    General Information and Staff    Patient location during procedure: OR  Anesthesiologist: Leopoldo Jones MD  Resident/CRNA: So Major CRNA  Performed: CRNA     Indications and Patient Condition  Indications for airway management: anesthesia  Sedation level: deep  Preoxygenated: yes  Patient position: sniffing  Mask difficulty assessment: 1 - vent by mask    Final Airway Details  Final airway type: endotracheal airway      Successful airway: ETT  Cuffed: yes   Successful intubation technique: direct laryngoscopy  Endotracheal tube insertion site: oral  Blade: Burton  Blade size: #3  ETT size (mm): 7.0    Cormack-Lehane Classification: grade I - full view of glottis  Placement verified by: chest auscultation and capnometry   Measured from: teeth  ETT to teeth (cm): 21  Number of attempts at approach: 1    Additional Comments  Atraumatic placement of ETT. Dentition, mucosa unchanged.

## 2022-07-13 RX ORDER — TRAMADOL HYDROCHLORIDE 50 MG/1
50 TABLET ORAL EVERY 6 HOURS PRN
Qty: 10 TABLET | Refills: 0 | Status: SHIPPED | OUTPATIENT
Start: 2022-07-13

## 2022-07-13 NOTE — OPERATIVE REPORT
[facility]    PATIENT'S NAME: Tomas CHANDLER   ATTENDING PHYSICIAN: Adams Ren MD   OPERATING PHYSICIAN: Adams Ren MD   PATIENT ACCOUNT#:   [id number]    LOCATION:  [address]  MEDICAL RECORD #:   [id number]       YOB: 1940  ADMISSION DATE:       07/12/2022      OPERATION DATE:  07/12/2022    OPERATIVE REPORT    PREOPERATIVE DIAGNOSIS:  Recurrent biliary colic with gallstones. POSTOPERATIVE DIAGNOSIS:  Recurrent biliary colic with gallstones. PROCEDURE:  Laparoscopic cholecystectomy, intraoperative cholangiogram.    INDICATIONS:  The patient is an 60-year-old female with recurrent pain who has had gallstones. We discussed the pros and cons of surgery, wanted to go ahead and proceed. She understands the risks. OPERATIVE TECHNIQUE:  Patient was prepped and draped in usual sterile fashion. Incision was made in the inferior aspect of the umbilicus. Open technique was used. A 10 mm trocar placed. A 5 mm port was placed in the left upper quadrant, 2 in the right side under direct vision. There were some adhesions on the right side, but we came in medial to these. The gallbladder was put on gentle traction. There were adhesions up to the gallbladder. There were some adhesions on the anterior surface of the stomach, but we could come around these pretty easily. We then dissected the duodenum. It was stuck up to the gallbladder gently. This was slowly dissected down as well, careful not to injure the duodenum. We then slowly dissected out the cystic duct and the cystic artery. The critical view was well seen. The clip was placed on the gallbladder cystic duct. Cholangiogram was performed. We could only put in a small amount due to valves. It was leaking a little bit, but we could see the common duct well. There was good flow with no filling defects. The cystic duct was then triply hemoclipped, careful not to impinge upon the common bile duct.   The cystic artery was similarly hemoclipped and divided. The gallbladder was placed on gentle traction. The gallbladder was dissected off the gallbladder bed. The gallbladder was placed in a bag and removed. We went back in, irrigated. There was no bile leak. Hemostasis was assured. We irrigated all the fluid out. Inspected the area. Hemostasis was carefully assured. The 5 mm ports were inspected on the way out. There was no bleeding. The ports have been removed. The fascia was closed with wide bites of 0 Vicryl under direct vision. Local anesthetic was given again. Skin was closed 4-0 Vicryl and Dermabond placed. Patient tolerated procedure well and went to recovery room in good condition. Dictated By Sara Jung MD  d: 07/12/2022 17:49:41  t: 07/13/2022 02:04:53  Job [id number]  YYW/

## 2022-09-26 ENCOUNTER — LAB ENCOUNTER (OUTPATIENT)
Dept: LAB | Age: 82
End: 2022-09-26
Attending: OPHTHALMOLOGY

## 2022-09-26 DIAGNOSIS — H53.2 DIPLOPIA: Primary | ICD-10-CM

## 2022-09-26 PROCEDURE — 84238 ASSAY NONENDOCRINE RECEPTOR: CPT

## 2022-09-26 PROCEDURE — 36415 COLL VENOUS BLD VENIPUNCTURE: CPT

## 2022-09-26 PROCEDURE — 83519 RIA NONANTIBODY: CPT

## 2022-09-26 PROCEDURE — 83516 IMMUNOASSAY NONANTIBODY: CPT

## 2022-09-26 PROCEDURE — 86255 FLUORESCENT ANTIBODY SCREEN: CPT

## 2022-10-01 LAB
ACETYLCHOLINE BINDING AB: 0 NMOL/L
ACETYLCHOLINE BLOCKING AB: 23 %
TITIN ANTIBODY: <0.09 IV

## 2023-01-11 NOTE — ED INITIAL ASSESSMENT (HPI)
Pt presents with mid right-side back pain radiating into right upper abdomen x 4 days. Pt reports no nausea, vomiting or constipation. Pt reports diarrhea x 1 on Tuesday 5/24/22. Pt reports pain is worse after eating, pain is intermittent. Pain is not reproducible with palpation or movement. no rashes , no suspicious lesions , no areas of discoloration , no jaundice present , good turgor , no masses , no tenderness on palpation

## 2023-02-25 ENCOUNTER — HOSPITAL ENCOUNTER (OUTPATIENT)
Dept: MAMMOGRAPHY | Age: 83
Discharge: HOME | End: 2023-02-25
Attending: INTERNAL MEDICINE
Payer: MEDICARE

## 2023-02-25 DIAGNOSIS — Z12.31 ENCOUNTER FOR SCREENING MAMMOGRAM FOR MALIGNANT NEOPLASM OF BREAST: ICD-10-CM

## 2023-02-25 PROCEDURE — 77063 BREAST TOMOSYNTHESIS BI: CPT | Performed by: INTERNAL MEDICINE

## 2023-02-25 PROCEDURE — 77067 SCR MAMMO BI INCL CAD: CPT | Performed by: INTERNAL MEDICINE

## 2024-02-29 ENCOUNTER — HOSPITAL ENCOUNTER (OUTPATIENT)
Dept: BONE DENSITY | Age: 84
Discharge: HOME | End: 2024-02-29
Attending: INTERNAL MEDICINE
Payer: MEDICARE

## 2024-02-29 DIAGNOSIS — Z78.0 ASYMPTOMATIC MENOPAUSE: ICD-10-CM

## 2024-02-29 PROCEDURE — 77080 DXA BONE DENSITY AXIAL: CPT | Performed by: INTERNAL MEDICINE

## 2024-11-07 ENCOUNTER — OFFICE VISIT (OUTPATIENT)
Dept: NEUROLOGY | Facility: CLINIC | Age: 84
End: 2024-11-07
Payer: MEDICARE

## 2024-11-07 VITALS
HEIGHT: 63 IN | DIASTOLIC BLOOD PRESSURE: 79 MMHG | SYSTOLIC BLOOD PRESSURE: 133 MMHG | BODY MASS INDEX: 25.69 KG/M2 | HEART RATE: 82 BPM | WEIGHT: 145 LBS

## 2024-11-07 DIAGNOSIS — R42 DIZZINESS: Primary | ICD-10-CM

## 2024-11-07 DIAGNOSIS — H81.8X9 PERSISTENT POSTURAL-PERCEPTUAL DIZZINESS: ICD-10-CM

## 2024-11-07 NOTE — PROGRESS NOTES
[facility]  [address], [address]  [facility] [address]  [phone]          [facility]IENT EVALUATION  Reason for Admission/Consultation:  dizziness    Requested by: Addis Arrieta DO  [address]  [address]  [place] [address]    PCP: Addis Arrieta DO  Chief Complaint: Dizziness (Patient presents here today to establish care, patient was referred by Addis Arrieta DO to evaluate and treat dizziness, patient c/o dizziness for x4 years. Patient states she gets dizziness when she walking, denies nausea. Patient has hx of Diplopia and seen by [facility]. )      HPI:  Joyce Diaz is a 84 year old  female w/ a pmhx of  right 6th nerve palsy  who presents for   dizziness.     Reports a fullness in the center of her head, ongoing prior to the pandemic.  Sx worse after the pandemic.  Has dizziness constantly.  Triggered by walking/movement.    Not necessarily worse in the dark.  She has intermittent blurred vision.    Dizziness is triggered by being in open spaces. She can't be in a warehouse big box store for long. Denies  hx of easily becoming motion sick or migraine.    She has a constant tinnitus.   Dizziness is better w sitting still.    She feels like her head will explode.      Review and summation of prior records  Reviewed ophthalmology notees from [facility]     ROS:  Pertinent positive and negatives per HPI.  All others were reviewed and negative.    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 Patient taking 88mcg, Disp: , Rfl:     Calcium Carbonate-Vitamin D 500-125 MG-UNIT Oral Tab, Take by mouth., Disp: , Rfl:     traMADol 50 MG Oral Tab, Take 1 tablet (50 mg total) by mouth every 6 (six) hours as needed for Pain., Disp: 10 tablet, Rfl: 0    traMADol 50 MG Oral Tab, Take 1 tablet (50 mg total) by mouth every 6 (six) hours as needed for Pain., Disp: 10 tablet, Rfl: 0    pantoprazole 20 MG Oral Tab EC, Take 1 tablet (20 mg total) by mouth every morning before breakfast., Disp: 90 tablet, Rfl: 3    Allergies[1]    Past Surgical History:   Procedure Laterality Date    Cataract      Cataract extraction w/  intraocular lens implant Bilateral 2008 and 2009    Colonoscopy N/A 10/29/2016    Procedure: COLONOSCOPY;  Surgeon: Astrid Loredo MD;  Location: [facility] ENDOSCOPY    Colonoscopy      Colonoscopy N/A 1/6/2020    Procedure: COLONOSCOPY;  Surgeon: Luis E Hurtado MD;  Location: [facility] ENDOSCOPY       Family History   Problem Relation Age of Onset    Cataracts Father     Macular degeneration Father     Cataracts Mother     Macular degeneration Mother     Heart Disorder Mother        Social history:  History   Smoking Status    Former    Packs/day: 0.00    Years: 5.00    Quit date: 7/7/1979   Smokeless Tobacco    Never     History   Alcohol Use Not Currently     History   Drug Use Unknown       Family History   Problem Relation Age of Onset    Cataracts Father     Macular degeneration Father     Cataracts Mother     Macular degeneration Mother     Heart Disorder Mother        Objective:  Vitals  Blood pressure 133/79, pulse 82, height 63\", weight 145 lb (65.8 kg).  /79 (BP Location: Left arm)   Pulse 82   Ht 63\"   Wt 145 lb (65.8 kg)   BMI 25.69 kg/m²   Body mass index is 25.69 kg/m².  Vitals:    11/07/24 1426 11/07/24 1430   BP Location: Right arm Left arm        Exam:  - General: appears stated age and no distress     - Pulmonary: CTAB. No signs of respiratory distress.     Neurologic Exam  - Mental Status: Alert and attentive. Oriented x4.  Speech is spontaneous, fluent, and prosodic. Comprehension and repetition intact. Phrase length and rate are normal. No paraphasic errors, neologisms, anomia, acalculia, apraxia, anosognosia, or R/L confusion.   - Cranial Nerves: No gaze preference. Visual fields:normal.  Pupils are equally round and reactive to light  in a well lit room. EOMI. She had a right 6th nerve palsy.  No nystagmus. No ptosis. V1 to V3 intact to LT and temperature in all 3 branches. No pathological facial asymmetry. No flattening of the nasolabial fold. .  Hearing grossly intact.  Tongue midline. No atrophy or fasiculations of the tongue noted. Palate and uvula elevate symmetrically.  Shoulder shrug symmetric.  - Fundoscopic exam:normal w/o hemorrhages, exudates, or papilledema.No attenuation. No pallor. No nystagmus when fixation is blocked.  - Motor:  normal tone, normal bulk. No interosseous wasting. No flattening of hypothenar eminences.       Right Left     Motor Strength   Deltoids 5 5  Triceps 5 5  Biceps 5 5  Wrist Extensors 5 5   5 5   Hip Flexors 5 5   Knee extensors 5 5  Knee flexors 5 5  Plantar flexion 5 5  Dorsiflexion 5 5      Pronator drift: No pronator drift   Arm Rolling: No orbiting.   Finger Taps: Finger taps are symmetric in rate and amplitude.    Rapid movements: Rapid/fine movements are symmetric. As expected their dominant hand is slightly faster.   Foot Taps: Foot taps are symmetric.      Asterixis: No asterixis noted.   Tremor:       Reflexes:    C5 C6 C7  L4 S1   R 2+ 2+  2+ 2+   L 2+ 2+  2+ 2+   Adductor Spread: No adductor spread noted.    Frontal release signs:Not assessed.    Jaw Jerk:    Stephanie's sign:absent   Nonsustained clonus: Absent   Sustained clonus: Absent   - Sensory:   Light touch: normal  Temperature:normal  Pinprick: normal  Vibration: normal  Proprioception:      Sensory level:      Romberg:   - Cerebellum: No truncal  ataxia. No titubations. No dysmetria, no dysdiadochokinesis. No overshoot.   - Gait/station: Normal gait and station. Symmetric arm swing.   - Toe walking:  - Heel walking:       Data reviewed    Test results/Imaging:   No results found for: \"TSH\"  No results found for: \"CHOL\", \"HDL\", \"LDL\", \"TRIG\"  Lab Results   Component Value Date    HGB 14.9 05/26/2022    HCT 44.6 05/26/2022    MCV 93.7 05/26/2022    WBC 5.8 05/26/2022    .0 05/26/2022      Lab Results   Component Value Date    BUN 12 05/26/2022    CA 9.2 05/26/2022    ALT 17 05/26/2022    AST 23 05/26/2022    ALB 3.6 05/26/2022     05/26/2022    K 3.4 (L) 05/26/2022     05/26/2022    CO2 26.0 05/26/2022      I have reviewed labs.                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 Refer back for more vestibular therapy. Will check mri brain and iac.  She may benefit from prisms. Her right 6th nerve couuld be contributing to her sx.         1. Dizziness  - PHYSICAL THERAPY - INTERNAL  - MRI IACS (W+WO) (CPT=70553); Future    2. Persistent postural-perceptual dizziness  - PHYSICAL THERAPY - INTERNAL  - MRI IACS (W+WO) (CPT=70553); Future      No orders of the defined types were placed in this encounter.               Return to clinic in: No follow-ups on file.    Judson Ervin DO  [facility]   11/07/24  2:49 PM       [1]   Allergies  Allergen Reactions    Sulfa Antibiotics SWELLING

## 2024-11-21 ENCOUNTER — APPOINTMENT (OUTPATIENT)
Dept: PHYSICAL THERAPY | Facility: HOSPITAL | Age: 84
End: 2024-11-21
Attending: Other
Payer: MEDICARE

## 2024-12-06 ENCOUNTER — APPOINTMENT (OUTPATIENT)
Dept: PHYSICAL THERAPY | Facility: HOSPITAL | Age: 84
End: 2024-12-06
Attending: Other
Payer: MEDICARE

## 2024-12-17 ENCOUNTER — APPOINTMENT (OUTPATIENT)
Dept: PHYSICAL THERAPY | Facility: HOSPITAL | Age: 84
End: 2024-12-17
Attending: Other
Payer: MEDICARE

## 2024-12-18 ENCOUNTER — HOSPITAL ENCOUNTER (OUTPATIENT)
Dept: MRI IMAGING | Age: 84
Discharge: HOME | End: 2024-12-18
Attending: Other
Payer: MEDICARE

## 2024-12-18 DIAGNOSIS — R42 DIZZINESS: ICD-10-CM

## 2024-12-18 DIAGNOSIS — H81.8X9 PERSISTENT POSTURAL-PERCEPTUAL DIZZINESS: ICD-10-CM

## 2024-12-18 PROCEDURE — 70553 MRI BRAIN STEM W/O & W/DYE: CPT | Performed by: OTHER

## 2024-12-18 PROCEDURE — A9575 INJ GADOTERATE MEGLUMI 0.1ML: HCPCS | Performed by: OTHER

## 2024-12-18 RX ORDER — GADOTERATE MEGLUMINE 376.9 MG/ML
15 INJECTION INTRAVENOUS
Status: COMPLETED | OUTPATIENT
Start: 2024-12-18 | End: 2024-12-18

## 2024-12-18 RX ADMIN — GADOTERATE MEGLUMINE 13 ML: 376.9 INJECTION INTRAVENOUS at 14:38:00

## 2024-12-19 ENCOUNTER — APPOINTMENT (OUTPATIENT)
Dept: PHYSICAL THERAPY | Facility: HOSPITAL | Age: 84
End: 2024-12-19
Attending: Other
Payer: MEDICARE

## 2024-12-27 ENCOUNTER — HOSPITAL ENCOUNTER (OUTPATIENT)
Dept: GENERAL RADIOLOGY | Age: 84
Discharge: HOME | End: 2024-12-27
Attending: INTERNAL MEDICINE
Payer: MEDICARE

## 2024-12-27 DIAGNOSIS — M25.562 LEFT MEDIAL KNEE PAIN: ICD-10-CM

## 2024-12-27 PROCEDURE — 73562 X-RAY EXAM OF KNEE 3: CPT | Performed by: INTERNAL MEDICINE

## 2025-01-02 ENCOUNTER — APPOINTMENT (OUTPATIENT)
Dept: PHYSICAL THERAPY | Facility: HOSPITAL | Age: 85
End: 2025-01-02
Attending: Other
Payer: MEDICARE

## 2025-01-07 ENCOUNTER — APPOINTMENT (OUTPATIENT)
Dept: PHYSICAL THERAPY | Facility: HOSPITAL | Age: 85
End: 2025-01-07
Attending: Other
Payer: MEDICARE

## 2025-01-09 ENCOUNTER — APPOINTMENT (OUTPATIENT)
Dept: PHYSICAL THERAPY | Facility: HOSPITAL | Age: 85
End: 2025-01-09
Attending: Other
Payer: MEDICARE

## 2025-01-14 ENCOUNTER — APPOINTMENT (OUTPATIENT)
Dept: PHYSICAL THERAPY | Facility: HOSPITAL | Age: 85
End: 2025-01-14
Attending: Other
Payer: MEDICARE

## 2025-01-16 ENCOUNTER — APPOINTMENT (OUTPATIENT)
Dept: PHYSICAL THERAPY | Facility: HOSPITAL | Age: 85
End: 2025-01-16
Attending: Other
Payer: MEDICARE

## 2025-02-10 ENCOUNTER — HOSPITAL ENCOUNTER (OUTPATIENT)
Dept: MAMMOGRAPHY | Age: 85
Discharge: HOME | End: 2025-02-10
Attending: INTERNAL MEDICINE
Payer: MEDICARE

## 2025-02-10 DIAGNOSIS — Z12.31 ENCOUNTER FOR SCREENING MAMMOGRAM FOR MALIGNANT NEOPLASM OF BREAST: ICD-10-CM

## 2025-02-10 PROCEDURE — 77067 SCR MAMMO BI INCL CAD: CPT | Performed by: INTERNAL MEDICINE

## 2025-02-10 PROCEDURE — 77063 BREAST TOMOSYNTHESIS BI: CPT | Performed by: INTERNAL MEDICINE

## 2025-02-21 ENCOUNTER — APPOINTMENT (OUTPATIENT)
Dept: PHYSICAL THERAPY | Age: 85
End: 2025-02-21
Attending: Other
Payer: MEDICARE

## 2025-02-28 ENCOUNTER — APPOINTMENT (OUTPATIENT)
Dept: PHYSICAL THERAPY | Age: 85
End: 2025-02-28
Attending: Other
Payer: MEDICARE

## 2025-03-07 ENCOUNTER — APPOINTMENT (OUTPATIENT)
Dept: PHYSICAL THERAPY | Age: 85
End: 2025-03-07
Attending: Other
Payer: MEDICARE

## 2025-07-03 ENCOUNTER — OFFICE VISIT (OUTPATIENT)
Dept: PHYSICAL THERAPY | Facility: HOSPITAL | Age: 85
End: 2025-07-03
Attending: Other
Payer: MEDICARE

## 2025-07-03 DIAGNOSIS — R42 DIZZINESS: Primary | ICD-10-CM

## 2025-07-03 DIAGNOSIS — H81.8X9 PERSISTENT POSTURAL-PERCEPTUAL DIZZINESS: ICD-10-CM

## 2025-07-03 PROCEDURE — 95992 CANALITH REPOSITIONING PROC: CPT

## 2025-07-03 PROCEDURE — 97162 PT EVAL MOD COMPLEX 30 MIN: CPT

## 2025-07-03 NOTE — PROGRESS NOTES
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  History of current condition: Marino, her , accompanies her. Pt was referred to vestibular therapy back in November of 2024 for dizziness. Onset of dizziness about 4-5 years ago. Dizziness when walking. Reports fullness in the center of the head, ongoing prior to the pandemic. Symptoms worsened after the pandemic. Dizziness is constant but triggered by walking and movement. Also worse with open spaces, like warehouses. Constant tinnitus. 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 Was given prisms which helps. Pt was going to the chiropractor for neck pain. No dizziness prior to the pandemic. Says she had an episode of room spinning vertigo in 2015. Did subside. Noticed when looking up that dizziness came back. Walking around the house is no big issue. But being outside in big open space, makes the imbalance worse.   Social History: lives in a house one level with a basement with 1 railing; 3 steps to enter no rail; laundry in the basement. lives with her    Occupation: retired   Leisure activities/Hobbies: pt plays games on her phone, goes out to eat, necessary gardening   Prior level of function: independent  Current limitations: walking outdoors or in open spaces, laying on her left side, and most activities when imbalance and dizziness is worse.  Pt goals: improve balance and get rid of dizziness  Symptoms with cough/sneeze or loud noise: No  Falls: No      Hx of migraines: No     Hx of vision issue: Yes diplopia and cranial nerve 6  palsy (right)  Hx of hearing issues: tinnitus    Dizziness: Current: 3 /10, Best: 2 /10, Worst:5 /10 (going in grocery stores or walking outside)  Quality: flutter in the head, pressure in the head, and imbalance  Frequency/Duration: constant, does not change in intensity   Aggravates: rolling; bending over; darkness/eyes closed; looking/reaching up; visual motion; shopping (laying on the left side)   Relieves: unsure     History of Headaches: negative       Dizziness Handicap Inventory: (Patient-Rptd) 68 - Severe Handicap    Past medical history was reviewed with Joyce.  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  She  has a past surgical history that includes cataract; colonoscopy (N/A, 10/29/2016); Cataract extraction w/  intraocular lens implant (Bilateral, 2008 and 2009); colonoscopy; and colonoscopy (N/A, 1/6/2020).    ASSESSMENT  Joyce presents to physical therapy evaluation with primary c/o imbalance and lightheadedness.. The results of the objective tests and measures show positive left eze hallpike. Functional deficits include but are not limited to walking outdoors or in open spaces, laying on her left side, and most activities when imbalance and dizziness is worse.. Signs and symptoms are consistent with diagnosis of left posterior canal BPPV. Pt and PT discussed evaluation findings, pathology, POC and HEP.  Pt voiced understanding and performs HEP correctly without reported pain. Skilled Physical Therapy is medically necessary to address the above impairments and reach functional goals.    OBJECTIVE:      Musculoskeltal:  Posture/Observation: normal   Cervical ROM WNL in all directions  Adverse neuro signs with ROM: No    Neurological:  Neuro Screen: Sensation: no reports of  paresthesias    Coordination Testing:   Finger to Nose: NT   Pronation/supination: NT  Toe tapping:  NT      Oculomotor & Vestibular Exam:  Extraocular Range of Motion: normal  Spontaneous Nystagmus:        room light: None         fixation blocked: None   Smooth Pursuit: normal (mad her slightly dizzy)  Saccades: normal   Gaze Evoked Nystagmus:        room light: normal       fixation blocked: normal   Head Impulse Test: normal bilaterally   VOR screen:  normal     VOR Cancellation: normal;    Convergence: normal;     Cover/Uncover: normal   Cross Cover: normal   Head Shaking Nystagmus: not tested       duration:    Dynamic Visual Acuity: not tested       degraded lines:  ; symptoms provoked:    Oculomotor Exam Comments:    Positional Testing:  [place] Hallpike - Left   Latency (seconds) 5 sec   Duration (seconds) 10 sec   Direction Lt torsional upbeating   Symptom provocation Yes       [place] Hallpike Right - negative, no symptoms Horizontal Roll Test-  negative, no symptoms    Balance and Functional Mobility:  TBD     Today’s Treatment and Response:   Pt education was provided on exam findings, treatment diagnosis, treatment plan, expectations, and prognosis.     Today's Treatment       7/3/2025   Vestibular Treatment   Additional Treatment CRM for left PC BPPV x2   -negative upon 2nd CRM  -5 min rest break between bouts    Pt colleen: anatomy and physiology of vestibular system with visual aid   Evaluation Minutes 25   Canalith Repositioning Minutes 18   Total Time Of Timed Procedures 0   Total Time Of Service-Based Procedures 43   Total Treatment Time 43   HEP BPPV pt edu handout        Patient was instructed in and issued a HEP for: BPPV pt edu handout  Pt was also provided recommendations for: possible dizziness after evaluation; symptom management.    Charges:  PT EVAL: Moderate Complexity, CRM  In agreement with evaluation findings and clinical rationale, this evaluation involved MODERATE COMPLEXITY decision making  due to 1-2 personal factors/comorbidities, 3 or more body structures involved/activity limitations, and evolving symptoms as documented in the evaluation.       PLAN OF CARE:      Goals: (to be met in 8 visits)   Negative eze hallpike and supine roll tests.  Able to perform position changes such as supine to/from sit, rolling, and bending over to the floor without dizziness to improve safety in functional tasks.   Able to ambulate with horizontal and vertical head turns for visual scanning without path deviation or dizziness to improve safety during gait.   Pt will be able to safely navigate uneven surfaces like grass and darker rooms safely and independently  Pt will demonstrate 30+ second of static stance with eyes closed in order to safely wash their face and hair without losing their balance.   Pt will be independent and compliant with HEP to maintain functional gains seen within therapy sessions       Frequency / Duration: Patient will be seen 1-2x/week or a total of 8 visits over a 90 day period. Treatment will include: neuromuscular re-education; balance training; eye/head coordination training; sensory organization training; habituation training for motion sensitivity and/or visual motion intolerance; canalith repositioning maneuver; symptom management instruction; patient education    Education or treatment limitation: None   Rehab Potential: good     Patient/Family/Caregiver was advised of these findings, precautions, and treatment options and has agreed to actively participate in planning and for this course of care.     Thank you for your referral. Please co-sign or sign and return this letter via fax as soon as possible to [phone]. If you have any questions, please contact me at Dept: [phone]    Sincerely,  Electronically signed by therapist: Michael Merino PT  Physician's certification required: Yes  I certify the need for these services furnished under this plan of treatment and while  under my care.    X___________________________________________________ Date____________________    Certification From: 7/3/2025  To: 10/1/2025

## 2025-07-08 ENCOUNTER — OFFICE VISIT (OUTPATIENT)
Dept: PHYSICAL THERAPY | Facility: HOSPITAL | Age: 85
End: 2025-07-08
Attending: Other
Payer: MEDICARE

## 2025-07-08 PROCEDURE — 97112 NEUROMUSCULAR REEDUCATION: CPT

## 2025-07-08 NOTE — PROGRESS NOTES
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 Still unbalanced but says the fullness in the head is better.       Pain: pain not reported     Objective  see flowsheet     0/10 dizziness upon arrival       Assessment  Pt had negative positional exam today. Pt does not necessarily meet the criteria for PPPD, but may have some intolerance to complex visual stimuli. Optokinetic videos had no effect on symptoms.    Goals (to be met in 8 visits)   Negative eze hallpike and supine roll tests.  Able to perform position changes such as supine to/from sit, rolling, and bending over to the floor without dizziness to improve safety in functional tasks.   Able to ambulate with horizontal and vertical head turns for visual scanning without path deviation or dizziness to improve safety during gait.   Pt will be able to safely navigate uneven surfaces like grass and darker rooms safely and independently  Pt will demonstrate 30+ second of static stance with eyes closed in order to safely wash their face and hair without losing their balance.   Pt will be independent and compliant with HEP to maintain functional gains seen within therapy sessions         Plan  grounding, eyes closed, dynamic balance    Treatment Last 4 Visits  Treatment Day: 2       7/3/2025 7/8/2025   Vestibular Treatment   Neuro Re-Education  No spontaneous nystagmus nor gaze evoked nystagmus   Negative positional exam    Functional Gait Assessment   Item Description Score (0 worst, 3 best)    ____3___1. Gait Level Surface-  Time:  _____6.02_____seconds  ____3___2. Change in gait speed  ____2___3. Gait with horizontal head turns   ____3___4. Gait with vertical head turns  ____3___5. Gait and pivot turn  ____3___6. Step over obstacle  ____1___7. Gait with narrow base of support-  # of steps____2____  ____2___8. Gait with eyes closed- slightly veered left  ____3___9. Ambulating backward  ____3___10. Steps    TOTAL SCORE: ____26___/30    (less than 22/30 = fall risk)      Persistent Postural Perceptual Dizziness (PPPD) Criteria according to ICVD:  A. One of more symptoms of dizziness, unsteadiness, or nonspinning vertigo are present on most days for 3mo or more  Symptoms last for prolonged periods of time but may wax and wane in severity  Symptoms need not be present continuously throughout the entire day  B. Persistent symptoms occur without specific provocation, but are exacerbated by three factors:  Upright posture  Active or passive motion without regard to direction or position  Exposure to moving visual stimuli or complex visual patterns  C. The disorder is precipitated by conditions that cause vertigo, unsteadiness, dizziness, or problems with balance including acute, episodic, or chronic vestibular syndromes; other neurologic or medical illnesses; or psychological distress  D. Symptoms cause significant distress or functional impairment  E. Symptoms are not better accounted for by another disease or disorder     Optokinetic videos     Walking in the hallway with head turns  Grounding       Additional Treatment CRM for left PC BPPV x2   -negative upon 2nd CRM  -5 min rest break between bouts    Pt colleen: anatomy and physiology of vestibular system with visual aid    Neuro Re-Educ Minutes  45   Evaluation Minutes 25    Canalith Repositioning Minutes 18    Total Time Of Timed Procedures 0 45   Total Time Of Service-Based Procedures 43 0   Total Treatment Time 43 45   HEP BPPV pt edu handout Walking with head turns and grounding         HEP  Walking with head turns and grounding    Charges  3 NMR

## 2025-07-15 ENCOUNTER — OFFICE VISIT (OUTPATIENT)
Dept: PHYSICAL THERAPY | Facility: HOSPITAL | Age: 85
End: 2025-07-15
Attending: Other
Payer: MEDICARE

## 2025-07-15 PROCEDURE — 97112 NEUROMUSCULAR REEDUCATION: CPT

## 2025-07-15 NOTE — PROGRESS NOTES
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 Pt arrives without any dizziness.       Pain: pain not reported     Objective  see flowsheet            Assessment  Pt had not had any dizziness but has some imbalance when turning and in the morning but overall she is doing very well. Updated HEP to include new exercises. Also discussed importance of prioritizing mental health and the correlation between emotional stress and perception of symptoms. Pt verbalized understanding.    Goals (to be met in 8 visits)   Negative eze hallpike and supine roll tests. met  Able to perform position changes such as supine to/from sit, rolling, and bending over to the floor without dizziness to improve safety in functional tasks. met  Able to ambulate with horizontal and vertical head turns for visual scanning without path deviation or dizziness to improve safety during gait.   Pt will be able to safely navigate uneven surfaces like grass and darker rooms safely and independently  Pt will demonstrate 30+ second of static stance with eyes closed in order to safely wash their face and hair without losing their balance.   Pt will be independent and compliant with HEP to maintain functional gains seen within therapy sessions           Plan  reassess    Treatment Last 4 Visits  Treatment Day: 3       7/3/2025 7/8/2025 7/15/2025   Vestibular Treatment   Neuro Re-Education  No spontaneous nystagmus nor gaze evoked nystagmus   Negative positional exam    Functional Gait Assessment   Item Description Score (0 worst, 3  best)    ____3___1. Gait Level Surface-  Time: _____6.02_____seconds  ____3___2. Change in gait speed  ____2___3. Gait with horizontal head turns   ____3___4. Gait with vertical head turns  ____3___5. Gait and pivot turn  ____3___6. Step over obstacle  ____1___7. Gait with narrow base of support-  # of steps____2____  ____2___8. Gait with eyes closed- slightly veered left  ____3___9. Ambulating backward  ____3___10. Steps    TOTAL SCORE: ____26___/30    (less than 22/30 = fall risk)      Persistent Postural Perceptual Dizziness (PPPD) Criteria according to ICVD:  A. One of more symptoms of dizziness, unsteadiness, or nonspinning vertigo are present on most days for 3mo or more  Symptoms last for prolonged periods of time but may wax and wane in severity  Symptoms need not be present continuously throughout the entire day  B. Persistent symptoms occur without specific provocation, but are exacerbated by three factors:  Upright posture  Active or passive motion without regard to direction or position  Exposure to moving visual stimuli or complex visual patterns  C. The disorder is precipitated by conditions that cause vertigo, unsteadiness, dizziness, or problems with balance including acute, episodic, or chronic vestibular syndromes; other neurologic or medical illnesses; or psychological distress  D. Symptoms cause significant distress or functional impairment  E. Symptoms are not better accounted for by another disease or disorder     Optokinetic videos     Walking in the hallway with head turns  Grounding     Romberg with head turns and nods EO- no issue  Romberg with head turns and nods EC- no issue    Bending over to  5 cones (no issues)  Weaving through cones (5) x1 no issues    5 blaze pods walking to tap (tuning) x30\"  5 blaze pods walking and bending to tap with hand x30\"  5 blaze pods side stepping to tap x30\"    Tandem stance on level ground 2x30\" bilat  SLS HEP    Walking in hallway with direction  changes on command    HEP update and pt edu    Additional Treatment CRM for left PC BPPV x2   -negative upon 2nd CRM  -5 min rest break between bouts    Pt colleen: anatomy and physiology of vestibular system with visual aid     Neuro Re-Educ Minutes  45 42   Evaluation Minutes 25     Canalith Repositioning Minutes 18     Total Time Of Timed Procedures 0 45 42   Total Time Of Service-Based Procedures 43 0 0   Total Treatment Time 43 45 42   HEP BPPV pt edu handout Walking with head turns and grounding - Standing Tandem Balance with Counter Support  - 3 x weekly - 3 sets - 30 sec hold  - Sideways Walking  - 3 x weekly - 3 sets  - Single Leg Stance with Support  - 3 x weekly - 3 sets - >5 sec... >10 sec.... hold  - Walk Turns  - 3 x weekly - 10 reps        HEP  - Standing Tandem Balance with Counter Support  - 3 x weekly - 3 sets - 30 sec hold  - Sideways Walking  - 3 x weekly - 3 sets  - Single Leg Stance with Support  - 3 x weekly - 3 sets - >5 sec... >10 sec.... hold  - Walk Turns  - 3 x weekly - 10 reps    Charges  3 NMR

## 2025-07-22 ENCOUNTER — APPOINTMENT (OUTPATIENT)
Dept: PHYSICAL THERAPY | Facility: HOSPITAL | Age: 85
End: 2025-07-22
Attending: Other

## 2025-07-29 ENCOUNTER — APPOINTMENT (OUTPATIENT)
Dept: PHYSICAL THERAPY | Facility: HOSPITAL | Age: 85
End: 2025-07-29
Attending: Other

## 2025-08-05 ENCOUNTER — APPOINTMENT (OUTPATIENT)
Dept: PHYSICAL THERAPY | Facility: HOSPITAL | Age: 85
End: 2025-08-05
Attending: Other

## 2025-08-12 ENCOUNTER — APPOINTMENT (OUTPATIENT)
Dept: PHYSICAL THERAPY | Facility: HOSPITAL | Age: 85
End: 2025-08-12
Attending: Other

## 2025-08-19 ENCOUNTER — APPOINTMENT (OUTPATIENT)
Dept: PHYSICAL THERAPY | Facility: HOSPITAL | Age: 85
End: 2025-08-19
Attending: Other

## 2025-08-26 ENCOUNTER — APPOINTMENT (OUTPATIENT)
Dept: PHYSICAL THERAPY | Facility: HOSPITAL | Age: 85
End: 2025-08-26
Attending: Other

## (undated) DEVICE — OPEN-END URETERAL CATHETER SOF-FLEX: Brand: SOF-FLEX

## (undated) DEVICE — MEDI-VAC NON-CONDUCTIVE SUCTION TUBING 6MM X 1.8M (6FT.) L: Brand: CARDINAL HEALTH

## (undated) DEVICE — 12 ML SYRINGE LUER-LOCK TIP: Brand: MONOJECT

## (undated) DEVICE — [HIGH FLOW INSUFFLATOR,  DO NOT USE IF PACKAGE IS DAMAGED,  KEEP DRY,  KEEP AWAY FROM SUNLIGHT,  PROTECT FROM HEAT AND RADIOACTIVE SOURCES.]: Brand: PNEUMOSURE

## (undated) DEVICE — MEGADYNE E-Z CLEAN BLADE 2.75"

## (undated) DEVICE — 6 ML SYRINGE LUER-LOCK TIP: Brand: MONOJECT

## (undated) DEVICE — DISPOSABLE LAPAROSCOPIC CLIP APPLIER WITH 20 CLIPS.: Brand: EPIX® UNIVERSAL CLIP APPLIER

## (undated) DEVICE — 35 ML SYRINGE REGULAR TIP: Brand: MONOJECT

## (undated) DEVICE — Device: Brand: CUSTOM PROCEDURE KIT

## (undated) DEVICE — INSUFFLATION NEEDLE TO ESTABLISH PNEUMOPERITONEUM.: Brand: INSUFFLATION NEEDLE

## (undated) DEVICE — LINE MNTR ADLT SET O2 INTMD

## (undated) DEVICE — Device: Brand: DEFENDO AIR/WATER/SUCTION AND BIOPSY VALVE

## (undated) DEVICE — SOLUTION  .9 3000ML

## (undated) DEVICE — TROCAR: Brand: KII FIOS FIRST ENTRY

## (undated) DEVICE — SPCMN DTCHBLE POUCH 5X7 500ML

## (undated) DEVICE — SUT VICRYL 0 UR-6 J603H

## (undated) DEVICE — NEEDLE HPO 18GA 1.5IN ECLPS

## (undated) DEVICE — 3 ML SYRINGE LUER-LOCK TIP: Brand: MONOJECT

## (undated) DEVICE — ENCORE® LATEX ACCLAIM SIZE 8, STERILE LATEX POWDER-FREE SURGICAL GLOVE: Brand: ENCORE

## (undated) DEVICE — SOLUTION  .9 1000ML BTL

## (undated) DEVICE — ENCORE® LATEX MICRO SIZE 8, STERILE LATEX POWDER-FREE SURGICAL GLOVE: Brand: ENCORE

## (undated) DEVICE — UNDYED BRAIDED (POLYGLACTIN 910), SYNTHETIC ABSORBABLE SUTURE: Brand: COATED VICRYL

## (undated) DEVICE — GOWN SURG AERO BLUE PERF LG

## (undated) DEVICE — GAUZE SPONGES,12 PLY: Brand: CURITY

## (undated) DEVICE — TROCAR: Brand: KII® SLEEVE

## (undated) DEVICE — LAP CHOLE: Brand: MEDLINE INDUSTRIES, INC.

## (undated) NOTE — LETTER
Addis Arrieta Do  [address] [facility] [address]  [place] [address]       11/07/24        Patient: Joyce Diaz   YOB: 1940   Date of Visit: 11/7/2024       Dear  Dr. Berny DO,      Thank you for referring Joyce Diaz to my practice.  Please find my assessment and plan below.      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 Refer back for more vestibular therapy. Will check mri brain and iac.  She may benefit from prisms. Her right 6th nerve couuld be contributing to her sx.         1. Dizziness  - PHYSICAL THERAPY - INTERNAL  - MRI IACS (W+WO) (CPT=70553); Future    2. Persistent postural-perceptual dizziness  - PHYSICAL THERAPY - INTERNAL  - MRI IACS (W+WO) (CPT=70553); Future      No orders of the defined types were placed in this encounter.                 Sincerely,   Judson Ervin DO   [facility], [place]  [id number]  [facility] [id number]    Document electronically generated by:  Judson Ervin DO

## (undated) NOTE — LETTER
Hospital Discharge Documentation  Please phone to schedule a hospital follow up appointment.     From: [id number] [facility] Office  Phone: [phone]    Patient discharged time/date: 7/27/2020  4:51 PM  Patient discharge disposition:  Home or Self Patient admits to taking full dose aspirin irregularly for right hip pain, occasional alcohol.   CT scan done in ER shows evidence of perforated peptic ulcer with associated gastritis, with induration around the area and mild pneumoperitoneum, there was charlie Order Current Status    ANAEROBIC CULTURE In process    AEROBIC BACTERIAL CULTURE Preliminary result          Discharge Plan:   Discharge Condition: Stable    Current Discharge Medication List    New Orders    Pantoprazole Sodium 40 MG Oral Tab EC  Take 1 Refills:  0     FISH OIL OR      Take by mouth.    Refills:  0     Levothyroxine Sodium 75 MCG Tabs      Take 75 mcg by mouth before breakfast.   Refills:  0     Restasis Multidose 0.05 % Emul  Generic drug:  cycloSPORINE      INSTILL 1 DROP IN BOTH EYES T

## (undated) NOTE — LETTER
[address] [place] [address]  Authorization for Invasive Procedures  1.  I hereby authorize Dr. Christopher Martinez , my physician and whomever may be designated as the doctor's assistant, to perform the following operation and/or procedure:  CT guided a following are some, but not all, of the potential risks that can occur: fever and allergic reactions, hemolytic reactions, transmission of disease such as hepatitis, AIDS, cytomegalovirus (CMV), and flluid overload.  In the event that I wish to have autolog administration of sedation/analgesia as may be necessary or desirable in the judgment of my physician.      Signature of Patient:  ________________________________________________ Date: _________Time: _________    Responsible person in case of minor or unco